# Patient Record
Sex: MALE | Race: WHITE
[De-identification: names, ages, dates, MRNs, and addresses within clinical notes are randomized per-mention and may not be internally consistent; named-entity substitution may affect disease eponyms.]

---

## 2017-05-04 ENCOUNTER — HOSPITAL ENCOUNTER (OUTPATIENT)
Dept: HOSPITAL 19 - COL.CAR | Age: 82
Discharge: HOME | End: 2017-05-04
Attending: INTERNAL MEDICINE
Payer: MEDICARE

## 2017-05-04 VITALS — HEIGHT: 68.05 IN | BODY MASS INDEX: 23.52 KG/M2 | WEIGHT: 155.21 LBS

## 2017-05-04 VITALS — DIASTOLIC BLOOD PRESSURE: 103 MMHG | HEART RATE: 52 BPM | SYSTOLIC BLOOD PRESSURE: 158 MMHG

## 2017-05-04 VITALS — SYSTOLIC BLOOD PRESSURE: 169 MMHG | HEART RATE: 44 BPM | DIASTOLIC BLOOD PRESSURE: 74 MMHG

## 2017-05-04 VITALS — HEART RATE: 44 BPM | DIASTOLIC BLOOD PRESSURE: 68 MMHG | SYSTOLIC BLOOD PRESSURE: 166 MMHG

## 2017-05-04 VITALS — HEART RATE: 42 BPM | DIASTOLIC BLOOD PRESSURE: 82 MMHG | SYSTOLIC BLOOD PRESSURE: 167 MMHG

## 2017-05-04 VITALS — HEART RATE: 44 BPM | SYSTOLIC BLOOD PRESSURE: 148 MMHG | DIASTOLIC BLOOD PRESSURE: 71 MMHG

## 2017-05-04 VITALS — DIASTOLIC BLOOD PRESSURE: 79 MMHG | HEART RATE: 51 BPM | SYSTOLIC BLOOD PRESSURE: 165 MMHG

## 2017-05-04 VITALS — SYSTOLIC BLOOD PRESSURE: 169 MMHG | DIASTOLIC BLOOD PRESSURE: 78 MMHG | HEART RATE: 48 BPM

## 2017-05-04 VITALS — SYSTOLIC BLOOD PRESSURE: 163 MMHG | DIASTOLIC BLOOD PRESSURE: 70 MMHG | HEART RATE: 43 BPM

## 2017-05-04 VITALS — HEART RATE: 68 BPM | SYSTOLIC BLOOD PRESSURE: 135 MMHG | DIASTOLIC BLOOD PRESSURE: 81 MMHG

## 2017-05-04 VITALS — DIASTOLIC BLOOD PRESSURE: 72 MMHG | HEART RATE: 49 BPM | SYSTOLIC BLOOD PRESSURE: 166 MMHG

## 2017-05-04 VITALS — DIASTOLIC BLOOD PRESSURE: 79 MMHG | HEART RATE: 48 BPM | SYSTOLIC BLOOD PRESSURE: 165 MMHG

## 2017-05-04 VITALS — SYSTOLIC BLOOD PRESSURE: 158 MMHG | DIASTOLIC BLOOD PRESSURE: 81 MMHG | HEART RATE: 54 BPM

## 2017-05-04 VITALS — TEMPERATURE: 97.6 F | DIASTOLIC BLOOD PRESSURE: 105 MMHG | HEART RATE: 54 BPM | SYSTOLIC BLOOD PRESSURE: 145 MMHG

## 2017-05-04 DIAGNOSIS — I10: ICD-10-CM

## 2017-05-04 DIAGNOSIS — I35.0: ICD-10-CM

## 2017-05-04 DIAGNOSIS — Z87.891: ICD-10-CM

## 2017-05-04 DIAGNOSIS — Z79.899: ICD-10-CM

## 2017-05-04 DIAGNOSIS — Z95.1: ICD-10-CM

## 2017-05-04 DIAGNOSIS — R06.09: ICD-10-CM

## 2017-05-04 DIAGNOSIS — Z86.73: ICD-10-CM

## 2017-05-04 DIAGNOSIS — I25.10: Primary | ICD-10-CM

## 2017-05-04 DIAGNOSIS — E78.2: ICD-10-CM

## 2017-05-04 DIAGNOSIS — J44.9: ICD-10-CM

## 2017-05-04 DIAGNOSIS — Z95.2: ICD-10-CM

## 2017-05-04 DIAGNOSIS — Z79.02: ICD-10-CM

## 2017-05-04 DIAGNOSIS — R94.39: ICD-10-CM

## 2017-05-04 DIAGNOSIS — I65.23: ICD-10-CM

## 2017-05-04 LAB
ANION GAP SERPL CALC-SCNC: 11 MMOL/L (ref 7–16)
BUN SERPL-MCNC: 20 MG/DL (ref 9–20)
CALCIUM SERPL-MCNC: 8.7 MG/DL (ref 8.4–10.2)
CHLORIDE SERPL-SCNC: 103 MMOL/L (ref 98–107)
CO2 SERPL-SCNC: 26 MMOL/L (ref 22–30)
CREAT SERPL-SCNC: 1.05 MG/DL (ref 0.66–1.25)
ERYTHROCYTE [DISTWIDTH] IN BLOOD BY AUTOMATED COUNT: 13.4 % (ref 11.5–14.5)
GLUCOSE SERPL-MCNC: 98 MG/DL (ref 74–106)
HCT VFR BLD AUTO: 41.7 % (ref 42–52)
HGB BLD-MCNC: 13.8 G/DL (ref 13.5–18)
INR BLD: 1 (ref 0.8–3)
MCH RBC QN AUTO: 31 PG (ref 27–31)
MCHC RBC AUTO-ENTMCNC: 33 G/DL (ref 33–37)
MCV RBC AUTO: 93 FL (ref 80–100)
PLATELET # BLD AUTO: 262 K/MM3 (ref 130–400)
PMV BLD AUTO: 9.7 FL (ref 7.4–10.4)
POTASSIUM SERPL-SCNC: 3.7 MMOL/L (ref 3.4–5)
PROTHROMBIN TIME: 11.3 SECONDS (ref 9.7–12.8)
RBC # BLD AUTO: 4.48 M/MM3 (ref 4.2–5.6)
SODIUM SERPL-SCNC: 140 MMOL/L (ref 137–145)
WBC # BLD AUTO: 9.5 K/MM3 (ref 4.8–10.8)

## 2017-05-04 PROCEDURE — C1769 GUIDE WIRE: HCPCS

## 2017-05-04 PROCEDURE — C1760 CLOSURE DEV, VASC: HCPCS

## 2017-05-29 ENCOUNTER — HOSPITAL ENCOUNTER (EMERGENCY)
Dept: HOSPITAL 19 - COL.ER | Age: 82
LOS: 1 days | Discharge: ANOTHER HOSPITAL | End: 2017-05-30
Payer: MEDICARE

## 2017-05-29 VITALS — BODY MASS INDEX: 24.29 KG/M2 | HEIGHT: 67.99 IN | WEIGHT: 160.28 LBS

## 2017-05-29 VITALS — TEMPERATURE: 98 F

## 2017-05-29 DIAGNOSIS — I25.10: ICD-10-CM

## 2017-05-29 DIAGNOSIS — R07.89: Primary | ICD-10-CM

## 2017-05-29 DIAGNOSIS — F31.9: ICD-10-CM

## 2017-05-29 DIAGNOSIS — Z87.891: ICD-10-CM

## 2017-05-29 DIAGNOSIS — Z79.82: ICD-10-CM

## 2017-05-29 DIAGNOSIS — Z95.9: ICD-10-CM

## 2017-05-29 DIAGNOSIS — M19.90: ICD-10-CM

## 2017-05-29 DIAGNOSIS — J45.909: ICD-10-CM

## 2017-05-29 DIAGNOSIS — K21.9: ICD-10-CM

## 2017-05-29 DIAGNOSIS — Z98.890: ICD-10-CM

## 2017-05-29 DIAGNOSIS — I10: ICD-10-CM

## 2017-05-30 VITALS — SYSTOLIC BLOOD PRESSURE: 146 MMHG | DIASTOLIC BLOOD PRESSURE: 87 MMHG | HEART RATE: 71 BPM

## 2017-05-30 LAB
ADJUSTED CALCIUM: 9 MG/DL (ref 8.4–10.2)
ALBUMIN SERPL-MCNC: 3.4 GM/DL (ref 3.5–5)
ALP SERPL-CCNC: 67 U/L (ref 50–136)
ALT SERPL-CCNC: 19 U/L (ref 21–72)
ANION GAP SERPL CALC-SCNC: 10 MMOL/L (ref 7–16)
BASOPHILS # BLD: 0.1 10*3/UL (ref 0–0.2)
BASOPHILS NFR BLD AUTO: 1.2 % (ref 0–2)
BILIRUB SERPL-MCNC: 0.5 MG/DL (ref 0–1)
BNP SERPL-MCNC: 3440 PG/ML (ref 0–450)
BUN SERPL-MCNC: 25 MG/DL (ref 9–20)
CALCIUM SERPL-MCNC: 8.5 MG/DL (ref 8.4–10.2)
CHLORIDE SERPL-SCNC: 104 MMOL/L (ref 98–107)
CO2 SERPL-SCNC: 25 MMOL/L (ref 22–30)
CREAT SERPL-SCNC: 0.98 MG/DL (ref 0.66–1.25)
EOSINOPHIL # BLD: 0.4 10*3/UL (ref 0–0.7)
EOSINOPHIL NFR BLD: 5 % (ref 0–4)
ERYTHROCYTE [DISTWIDTH] IN BLOOD BY AUTOMATED COUNT: 13.5 % (ref 11.5–14.5)
GLUCOSE SERPL-MCNC: 102 MG/DL (ref 74–106)
GRANULOCYTES # BLD AUTO: 52.6 % (ref 42.2–75.2)
HCT VFR BLD AUTO: 41.3 % (ref 42–52)
HGB BLD-MCNC: 13.6 G/DL (ref 13.5–18)
LIPASE SERPL-CCNC: 121 U/L (ref 23–300)
LYMPHOCYTES # BLD: 2.6 10*3/UL (ref 1.2–3.4)
LYMPHOCYTES NFR BLD: 30.6 % (ref 20–51)
MCH RBC QN AUTO: 31 PG (ref 27–31)
MCHC RBC AUTO-ENTMCNC: 33 G/DL (ref 33–37)
MCV RBC AUTO: 93 FL (ref 80–100)
MONOCYTES # BLD: 0.9 10*3/UL (ref 0.1–0.6)
MONOCYTES NFR BLD AUTO: 10.1 % (ref 1.7–9.3)
NEUTROPHILS # BLD: 4.4 10*3/UL (ref 1.4–6.5)
PLATELET # BLD AUTO: 232 K/MM3 (ref 130–400)
PMV BLD AUTO: 9.9 FL (ref 7.4–10.4)
POTASSIUM SERPL-SCNC: 3.6 MMOL/L (ref 3.4–5)
PROT SERPL-MCNC: 6 GM/DL (ref 6.4–8.2)
RBC # BLD AUTO: 4.44 M/MM3 (ref 4.2–5.6)
SODIUM SERPL-SCNC: 140 MMOL/L (ref 137–145)
TROPONIN I SERPL-MCNC: 0.02 NG/ML (ref 0–0.03)
WBC # BLD AUTO: 8.4 K/MM3 (ref 4.8–10.8)

## 2017-07-18 ENCOUNTER — HOSPITAL ENCOUNTER (INPATIENT)
Dept: HOSPITAL 19 - COL.ER | Age: 82
LOS: 3 days | Discharge: HOME | DRG: 281 | End: 2017-07-21
Attending: INTERNAL MEDICINE | Admitting: INTERNAL MEDICINE
Payer: MEDICARE

## 2017-07-18 VITALS — WEIGHT: 178.79 LBS | HEIGHT: 67 IN | BODY MASS INDEX: 28.06 KG/M2

## 2017-07-18 DIAGNOSIS — Z95.5: ICD-10-CM

## 2017-07-18 DIAGNOSIS — I21.4: ICD-10-CM

## 2017-07-18 DIAGNOSIS — J45.909: ICD-10-CM

## 2017-07-18 DIAGNOSIS — Z87.891: ICD-10-CM

## 2017-07-18 DIAGNOSIS — I42.9: ICD-10-CM

## 2017-07-18 DIAGNOSIS — Z86.73: ICD-10-CM

## 2017-07-18 DIAGNOSIS — I25.10: ICD-10-CM

## 2017-07-18 DIAGNOSIS — I10: Primary | ICD-10-CM

## 2017-07-18 DIAGNOSIS — Z95.2: ICD-10-CM

## 2017-07-18 LAB
ADJUSTED CALCIUM: 9.1 MG/DL (ref 8.4–10.2)
ALBUMIN SERPL-MCNC: 3.1 GM/DL (ref 3.5–5)
ALP SERPL-CCNC: 68 U/L (ref 50–136)
ALT SERPL-CCNC: 20 U/L (ref 21–72)
ANION GAP SERPL CALC-SCNC: 9 MMOL/L (ref 7–16)
APTT PPP: 30.4 SECONDS (ref 26–37)
BASOPHILS # BLD: 0.1 10*3/UL (ref 0–0.2)
BASOPHILS NFR BLD AUTO: 1.4 % (ref 0–2)
BILIRUB SERPL-MCNC: 0.6 MG/DL (ref 0–1)
BNP SERPL-MCNC: 5200 PG/ML (ref 0–450)
BUN SERPL-MCNC: 22 MG/DL (ref 9–20)
CALCIUM SERPL-MCNC: 8.4 MG/DL (ref 8.4–10.2)
CHLORIDE SERPL-SCNC: 102 MMOL/L (ref 98–107)
CO2 SERPL-SCNC: 24 MMOL/L (ref 22–30)
CREAT SERPL-SCNC: 1.01 MG/DL (ref 0.66–1.25)
EOSINOPHIL # BLD: 0.1 10*3/UL (ref 0–0.7)
EOSINOPHIL NFR BLD: 1.7 % (ref 0–4)
ERYTHROCYTE [DISTWIDTH] IN BLOOD BY AUTOMATED COUNT: 13.4 % (ref 11.5–14.5)
GLUCOSE SERPL-MCNC: 94 MG/DL (ref 74–106)
GRANULOCYTES # BLD AUTO: 39.4 % (ref 42.2–75.2)
HCT VFR BLD AUTO: 34.9 % (ref 42–52)
HGB BLD-MCNC: 11.5 G/DL (ref 13.5–18)
INR BLD: 0.9 (ref 0.8–3)
LYMPHOCYTES # BLD: 2.4 10*3/UL (ref 1.2–3.4)
LYMPHOCYTES NFR BLD: 40.7 % (ref 20–51)
MCH RBC QN AUTO: 30 PG (ref 27–31)
MCHC RBC AUTO-ENTMCNC: 33 G/DL (ref 33–37)
MCV RBC AUTO: 92 FL (ref 80–100)
MONOCYTES # BLD: 1 10*3/UL (ref 0.1–0.6)
MONOCYTES NFR BLD AUTO: 16.3 % (ref 1.7–9.3)
NEUTROPHILS # BLD: 2.3 10*3/UL (ref 1.4–6.5)
PLATELET # BLD AUTO: 188 K/MM3 (ref 130–400)
PMV BLD AUTO: 9.9 FL (ref 7.4–10.4)
POTASSIUM SERPL-SCNC: 3.6 MMOL/L (ref 3.4–5)
PROT SERPL-MCNC: 5.7 GM/DL (ref 6.4–8.2)
PROTHROMBIN TIME: 10.3 SECONDS (ref 9.7–12.8)
RBC # BLD AUTO: 3.78 M/MM3 (ref 4.2–5.6)
SODIUM SERPL-SCNC: 135 MMOL/L (ref 137–145)
TROPONIN I SERPL-MCNC: 0.24 NG/ML (ref 0–0.03)
WBC # BLD AUTO: 5.9 K/MM3 (ref 4.8–10.8)

## 2017-07-18 PROCEDURE — C1760 CLOSURE DEV, VASC: HCPCS

## 2017-07-18 PROCEDURE — C1894 INTRO/SHEATH, NON-LASER: HCPCS

## 2017-07-18 PROCEDURE — A9502 TC99M TETROFOSMIN: HCPCS

## 2017-07-19 VITALS — OXYGEN SATURATION: 99 %

## 2017-07-19 VITALS — OXYGEN SATURATION: 98 %

## 2017-07-19 VITALS — OXYGEN SATURATION: 96 %

## 2017-07-19 VITALS — OXYGEN SATURATION: 97 %

## 2017-07-19 VITALS — OXYGEN SATURATION: 93 %

## 2017-07-19 VITALS — SYSTOLIC BLOOD PRESSURE: 142 MMHG | DIASTOLIC BLOOD PRESSURE: 53 MMHG | TEMPERATURE: 97 F | HEART RATE: 46 BPM

## 2017-07-19 VITALS
OXYGEN SATURATION: 97 % | HEART RATE: 64 BPM | SYSTOLIC BLOOD PRESSURE: 167 MMHG | TEMPERATURE: 98.6 F | DIASTOLIC BLOOD PRESSURE: 57 MMHG

## 2017-07-19 VITALS — OXYGEN SATURATION: 95 %

## 2017-07-19 VITALS — OXYGEN SATURATION: 94 %

## 2017-07-19 VITALS — OXYGEN SATURATION: 92 %

## 2017-07-19 VITALS — HEART RATE: 59 BPM | TEMPERATURE: 98.2 F | SYSTOLIC BLOOD PRESSURE: 138 MMHG | DIASTOLIC BLOOD PRESSURE: 52 MMHG

## 2017-07-19 VITALS — SYSTOLIC BLOOD PRESSURE: 149 MMHG | HEART RATE: 49 BPM | DIASTOLIC BLOOD PRESSURE: 51 MMHG | TEMPERATURE: 97.6 F

## 2017-07-19 VITALS
TEMPERATURE: 98.5 F | OXYGEN SATURATION: 98 % | SYSTOLIC BLOOD PRESSURE: 144 MMHG | HEART RATE: 60 BPM | DIASTOLIC BLOOD PRESSURE: 55 MMHG

## 2017-07-19 VITALS
SYSTOLIC BLOOD PRESSURE: 139 MMHG | OXYGEN SATURATION: 98 % | DIASTOLIC BLOOD PRESSURE: 49 MMHG | HEART RATE: 49 BPM | TEMPERATURE: 98.3 F

## 2017-07-19 VITALS — HEART RATE: 47 BPM | SYSTOLIC BLOOD PRESSURE: 149 MMHG | DIASTOLIC BLOOD PRESSURE: 50 MMHG | TEMPERATURE: 98.3 F

## 2017-07-19 VITALS — SYSTOLIC BLOOD PRESSURE: 142 MMHG | DIASTOLIC BLOOD PRESSURE: 53 MMHG | HEART RATE: 46 BPM

## 2017-07-19 VITALS — DIASTOLIC BLOOD PRESSURE: 62 MMHG | HEART RATE: 82 BPM | SYSTOLIC BLOOD PRESSURE: 144 MMHG

## 2017-07-19 VITALS — OXYGEN SATURATION: 91 %

## 2017-07-19 VITALS — HEART RATE: 54 BPM | TEMPERATURE: 97.1 F | SYSTOLIC BLOOD PRESSURE: 140 MMHG | DIASTOLIC BLOOD PRESSURE: 53 MMHG

## 2017-07-19 VITALS — SYSTOLIC BLOOD PRESSURE: 158 MMHG | HEART RATE: 75 BPM | DIASTOLIC BLOOD PRESSURE: 57 MMHG

## 2017-07-19 VITALS — SYSTOLIC BLOOD PRESSURE: 155 MMHG | HEART RATE: 66 BPM | DIASTOLIC BLOOD PRESSURE: 59 MMHG

## 2017-07-19 VITALS — OXYGEN SATURATION: 100 %

## 2017-07-19 LAB
ANION GAP SERPL CALC-SCNC: 5 MMOL/L (ref 7–16)
BASOPHILS # BLD: 0.1 10*3/UL (ref 0–0.2)
BASOPHILS NFR BLD AUTO: 1.3 % (ref 0–2)
BUN SERPL-MCNC: 21 MG/DL (ref 9–20)
CALCIUM SERPL-MCNC: 8 MG/DL (ref 8.4–10.2)
CHLORIDE SERPL-SCNC: 101 MMOL/L (ref 98–107)
CHOLEST SPEC-SCNC: 149 MG/DL (ref 120–200)
CO2 SERPL-SCNC: 26 MMOL/L (ref 22–30)
CREAT SERPL-SCNC: 1.06 MG/DL (ref 0.66–1.25)
EOSINOPHIL # BLD: 0.2 10*3/UL (ref 0–0.7)
EOSINOPHIL NFR BLD: 3.8 % (ref 0–4)
ERYTHROCYTE [DISTWIDTH] IN BLOOD BY AUTOMATED COUNT: 13.5 % (ref 11.5–14.5)
GLUCOSE SERPL-MCNC: 86 MG/DL (ref 74–106)
GRANULOCYTES # BLD AUTO: 37.7 % (ref 42.2–75.2)
HCT VFR BLD AUTO: 30.5 % (ref 42–52)
HDLC SERPL-MCNC: 40 MG/DL
HGB BLD-MCNC: 10 G/DL (ref 13.5–18)
LDLC SERPL-MCNC: 84 MG/DL
LYMPHOCYTES # BLD: 2.3 10*3/UL (ref 1.2–3.4)
LYMPHOCYTES NFR BLD: 41.6 % (ref 20–51)
MAGNESIUM SERPL-MCNC: 2 MG/DL (ref 1.6–2.3)
MCH RBC QN AUTO: 31 PG (ref 27–31)
MCHC RBC AUTO-ENTMCNC: 33 G/DL (ref 33–37)
MCV RBC AUTO: 93 FL (ref 80–100)
MONOCYTES # BLD: 0.8 10*3/UL (ref 0.1–0.6)
MONOCYTES NFR BLD AUTO: 15.1 % (ref 1.7–9.3)
NEUTROPHILS # BLD: 2.1 10*3/UL (ref 1.4–6.5)
PLATELET # BLD AUTO: 170 K/MM3 (ref 130–400)
PMV BLD AUTO: 10.1 FL (ref 7.4–10.4)
POTASSIUM SERPL-SCNC: 3.7 MMOL/L (ref 3.4–5)
RBC # BLD AUTO: 3.27 M/MM3 (ref 4.2–5.6)
SODIUM SERPL-SCNC: 133 MMOL/L (ref 137–145)
TRIGL SERPL-MCNC: 126 MG/DL
WBC # BLD AUTO: 5.5 K/MM3 (ref 4.8–10.8)

## 2017-07-20 VITALS — SYSTOLIC BLOOD PRESSURE: 148 MMHG | DIASTOLIC BLOOD PRESSURE: 51 MMHG | HEART RATE: 52 BPM

## 2017-07-20 VITALS — OXYGEN SATURATION: 93 %

## 2017-07-20 VITALS — OXYGEN SATURATION: 94 %

## 2017-07-20 VITALS — OXYGEN SATURATION: 92 %

## 2017-07-20 VITALS — OXYGEN SATURATION: 96 %

## 2017-07-20 VITALS — TEMPERATURE: 98.5 F | SYSTOLIC BLOOD PRESSURE: 157 MMHG | DIASTOLIC BLOOD PRESSURE: 49 MMHG | HEART RATE: 57 BPM

## 2017-07-20 VITALS — TEMPERATURE: 97.5 F | DIASTOLIC BLOOD PRESSURE: 44 MMHG | SYSTOLIC BLOOD PRESSURE: 113 MMHG | HEART RATE: 61 BPM

## 2017-07-20 VITALS — OXYGEN SATURATION: 95 %

## 2017-07-20 VITALS — OXYGEN SATURATION: 98 %

## 2017-07-20 VITALS — OXYGEN SATURATION: 97 %

## 2017-07-20 VITALS — HEART RATE: 52 BPM | DIASTOLIC BLOOD PRESSURE: 58 MMHG | SYSTOLIC BLOOD PRESSURE: 159 MMHG

## 2017-07-20 VITALS — HEART RATE: 48 BPM | DIASTOLIC BLOOD PRESSURE: 46 MMHG | OXYGEN SATURATION: 92 % | SYSTOLIC BLOOD PRESSURE: 128 MMHG

## 2017-07-20 VITALS — DIASTOLIC BLOOD PRESSURE: 62 MMHG | SYSTOLIC BLOOD PRESSURE: 156 MMHG | HEART RATE: 69 BPM

## 2017-07-20 VITALS — DIASTOLIC BLOOD PRESSURE: 55 MMHG | TEMPERATURE: 97.5 F | HEART RATE: 51 BPM | SYSTOLIC BLOOD PRESSURE: 154 MMHG

## 2017-07-20 VITALS — HEART RATE: 55 BPM | SYSTOLIC BLOOD PRESSURE: 150 MMHG | DIASTOLIC BLOOD PRESSURE: 56 MMHG

## 2017-07-20 VITALS — SYSTOLIC BLOOD PRESSURE: 147 MMHG | HEART RATE: 50 BPM | DIASTOLIC BLOOD PRESSURE: 52 MMHG

## 2017-07-20 VITALS — OXYGEN SATURATION: 91 %

## 2017-07-20 VITALS — OXYGEN SATURATION: 96 % | SYSTOLIC BLOOD PRESSURE: 158 MMHG | DIASTOLIC BLOOD PRESSURE: 55 MMHG | HEART RATE: 52 BPM

## 2017-07-20 VITALS — HEART RATE: 57 BPM | TEMPERATURE: 97.8 F | DIASTOLIC BLOOD PRESSURE: 61 MMHG | SYSTOLIC BLOOD PRESSURE: 167 MMHG

## 2017-07-20 VITALS — SYSTOLIC BLOOD PRESSURE: 151 MMHG | HEART RATE: 55 BPM | DIASTOLIC BLOOD PRESSURE: 55 MMHG

## 2017-07-20 VITALS — DIASTOLIC BLOOD PRESSURE: 49 MMHG | HEART RATE: 91 BPM | SYSTOLIC BLOOD PRESSURE: 149 MMHG

## 2017-07-20 VITALS
OXYGEN SATURATION: 97 % | HEART RATE: 53 BPM | SYSTOLIC BLOOD PRESSURE: 172 MMHG | TEMPERATURE: 97.2 F | DIASTOLIC BLOOD PRESSURE: 60 MMHG

## 2017-07-20 VITALS — SYSTOLIC BLOOD PRESSURE: 113 MMHG | HEART RATE: 61 BPM | DIASTOLIC BLOOD PRESSURE: 44 MMHG | TEMPERATURE: 97.5 F

## 2017-07-20 VITALS — SYSTOLIC BLOOD PRESSURE: 133 MMHG | DIASTOLIC BLOOD PRESSURE: 50 MMHG | HEART RATE: 69 BPM

## 2017-07-20 VITALS — DIASTOLIC BLOOD PRESSURE: 59 MMHG | SYSTOLIC BLOOD PRESSURE: 155 MMHG | HEART RATE: 55 BPM

## 2017-07-20 VITALS — SYSTOLIC BLOOD PRESSURE: 156 MMHG | DIASTOLIC BLOOD PRESSURE: 59 MMHG | HEART RATE: 49 BPM

## 2017-07-20 VITALS — OXYGEN SATURATION: 94 % | DIASTOLIC BLOOD PRESSURE: 44 MMHG | HEART RATE: 60 BPM | SYSTOLIC BLOOD PRESSURE: 121 MMHG

## 2017-07-20 VITALS — OXYGEN SATURATION: 83 %

## 2017-07-20 LAB
ANION GAP SERPL CALC-SCNC: 5 MMOL/L (ref 7–16)
BASOPHILS # BLD: 0.1 10*3/UL (ref 0–0.2)
BASOPHILS NFR BLD AUTO: 1.2 % (ref 0–2)
BUN SERPL-MCNC: 19 MG/DL (ref 9–20)
CALCIUM SERPL-MCNC: 7.7 MG/DL (ref 8.4–10.2)
CHLORIDE SERPL-SCNC: 106 MMOL/L (ref 98–107)
CO2 SERPL-SCNC: 25 MMOL/L (ref 22–30)
CREAT SERPL-SCNC: 1.09 MG/DL (ref 0.66–1.25)
EOSINOPHIL # BLD: 0.3 10*3/UL (ref 0–0.7)
EOSINOPHIL NFR BLD: 3.8 % (ref 0–4)
ERYTHROCYTE [DISTWIDTH] IN BLOOD BY AUTOMATED COUNT: 13.7 % (ref 11.5–14.5)
GLUCOSE SERPL-MCNC: 80 MG/DL (ref 74–106)
GRANULOCYTES # BLD AUTO: 54 % (ref 42.2–75.2)
HCT VFR BLD AUTO: 34.5 % (ref 42–52)
HGB BLD-MCNC: 11.4 G/DL (ref 13.5–18)
INR BLD: 1 (ref 0.8–3)
LYMPHOCYTES # BLD: 1.9 10*3/UL (ref 1.2–3.4)
LYMPHOCYTES NFR BLD: 28.9 % (ref 20–51)
MCH RBC QN AUTO: 31 PG (ref 27–31)
MCHC RBC AUTO-ENTMCNC: 33 G/DL (ref 33–37)
MCV RBC AUTO: 93 FL (ref 80–100)
MONOCYTES # BLD: 0.8 10*3/UL (ref 0.1–0.6)
MONOCYTES NFR BLD AUTO: 11.5 % (ref 1.7–9.3)
NEUTROPHILS # BLD: 3.5 10*3/UL (ref 1.4–6.5)
PLATELET # BLD AUTO: 178 K/MM3 (ref 130–400)
PMV BLD AUTO: 9.4 FL (ref 7.4–10.4)
POTASSIUM SERPL-SCNC: 3.8 MMOL/L (ref 3.4–5)
PROTHROMBIN TIME: 11.2 SECONDS (ref 9.7–12.8)
RBC # BLD AUTO: 3.72 M/MM3 (ref 4.2–5.6)
SODIUM SERPL-SCNC: 135 MMOL/L (ref 137–145)
WBC # BLD AUTO: 6.5 K/MM3 (ref 4.8–10.8)

## 2017-07-20 PROCEDURE — B2111ZZ FLUOROSCOPY OF MULTIPLE CORONARY ARTERIES USING LOW OSMOLAR CONTRAST: ICD-10-PCS | Performed by: INTERNAL MEDICINE

## 2017-07-21 VITALS — SYSTOLIC BLOOD PRESSURE: 143 MMHG | DIASTOLIC BLOOD PRESSURE: 47 MMHG | HEART RATE: 55 BPM | TEMPERATURE: 97.6 F

## 2017-07-21 VITALS — HEART RATE: 72 BPM | SYSTOLIC BLOOD PRESSURE: 120 MMHG | DIASTOLIC BLOOD PRESSURE: 48 MMHG | TEMPERATURE: 97.4 F

## 2017-07-21 VITALS — DIASTOLIC BLOOD PRESSURE: 54 MMHG | SYSTOLIC BLOOD PRESSURE: 164 MMHG | HEART RATE: 66 BPM | TEMPERATURE: 99.2 F

## 2017-07-21 VITALS — DIASTOLIC BLOOD PRESSURE: 49 MMHG | TEMPERATURE: 98.5 F | SYSTOLIC BLOOD PRESSURE: 157 MMHG | HEART RATE: 57 BPM

## 2017-07-21 VITALS — TEMPERATURE: 99.2 F | SYSTOLIC BLOOD PRESSURE: 164 MMHG | DIASTOLIC BLOOD PRESSURE: 54 MMHG | HEART RATE: 66 BPM

## 2018-03-16 ENCOUNTER — HOSPITAL ENCOUNTER (OUTPATIENT)
Dept: HOSPITAL 19 - SDCO | Age: 83
Discharge: HOME | End: 2018-03-16
Attending: SPECIALIST
Payer: MEDICARE

## 2018-03-16 VITALS — HEART RATE: 61 BPM | DIASTOLIC BLOOD PRESSURE: 46 MMHG | SYSTOLIC BLOOD PRESSURE: 126 MMHG

## 2018-03-16 VITALS — WEIGHT: 149.47 LBS | BODY MASS INDEX: 22.65 KG/M2 | HEIGHT: 68 IN

## 2018-03-16 VITALS — HEART RATE: 59 BPM | SYSTOLIC BLOOD PRESSURE: 129 MMHG | DIASTOLIC BLOOD PRESSURE: 48 MMHG

## 2018-03-16 VITALS — TEMPERATURE: 97.8 F | HEART RATE: 65 BPM | DIASTOLIC BLOOD PRESSURE: 56 MMHG | SYSTOLIC BLOOD PRESSURE: 125 MMHG

## 2018-03-16 VITALS — SYSTOLIC BLOOD PRESSURE: 109 MMHG | HEART RATE: 57 BPM | DIASTOLIC BLOOD PRESSURE: 46 MMHG | TEMPERATURE: 97.1 F

## 2018-03-16 VITALS — HEART RATE: 48 BPM | DIASTOLIC BLOOD PRESSURE: 50 MMHG | SYSTOLIC BLOOD PRESSURE: 109 MMHG

## 2018-03-16 DIAGNOSIS — R63.4: ICD-10-CM

## 2018-03-16 DIAGNOSIS — I10: ICD-10-CM

## 2018-03-16 DIAGNOSIS — M19.90: ICD-10-CM

## 2018-03-16 DIAGNOSIS — K21.9: ICD-10-CM

## 2018-03-16 DIAGNOSIS — I25.10: ICD-10-CM

## 2018-03-16 DIAGNOSIS — D64.9: ICD-10-CM

## 2018-03-16 DIAGNOSIS — Z79.01: ICD-10-CM

## 2018-03-16 DIAGNOSIS — E03.9: ICD-10-CM

## 2018-03-16 DIAGNOSIS — R19.7: ICD-10-CM

## 2018-03-16 DIAGNOSIS — I42.9: ICD-10-CM

## 2018-03-16 DIAGNOSIS — K59.00: Primary | ICD-10-CM

## 2018-03-16 DIAGNOSIS — J45.909: ICD-10-CM

## 2018-03-16 DIAGNOSIS — R10.13: ICD-10-CM

## 2018-03-16 DIAGNOSIS — J84.10: ICD-10-CM

## 2018-03-16 DIAGNOSIS — K57.30: ICD-10-CM

## 2018-03-16 DIAGNOSIS — Z95.2: ICD-10-CM

## 2018-03-16 DIAGNOSIS — E78.00: ICD-10-CM

## 2018-03-16 DIAGNOSIS — K44.9: ICD-10-CM

## 2018-03-16 DIAGNOSIS — Z88.1: ICD-10-CM

## 2018-05-10 ENCOUNTER — HOSPITAL ENCOUNTER (INPATIENT)
Dept: HOSPITAL 19 - COL.ER | Age: 83
LOS: 2 days | Discharge: HOME | DRG: 868 | End: 2018-05-12
Attending: FAMILY MEDICINE | Admitting: FAMILY MEDICINE
Payer: MEDICARE

## 2018-05-10 VITALS — HEART RATE: 88 BPM | SYSTOLIC BLOOD PRESSURE: 138 MMHG | DIASTOLIC BLOOD PRESSURE: 47 MMHG

## 2018-05-10 VITALS — WEIGHT: 135.36 LBS | BODY MASS INDEX: 20.52 KG/M2 | HEIGHT: 67.99 IN

## 2018-05-10 VITALS — SYSTOLIC BLOOD PRESSURE: 118 MMHG | DIASTOLIC BLOOD PRESSURE: 44 MMHG | TEMPERATURE: 97.9 F | HEART RATE: 79 BPM

## 2018-05-10 DIAGNOSIS — E87.6: ICD-10-CM

## 2018-05-10 DIAGNOSIS — Z95.2: ICD-10-CM

## 2018-05-10 DIAGNOSIS — Z87.891: ICD-10-CM

## 2018-05-10 DIAGNOSIS — A02.8: Primary | ICD-10-CM

## 2018-05-10 DIAGNOSIS — Z79.01: ICD-10-CM

## 2018-05-10 DIAGNOSIS — I10: ICD-10-CM

## 2018-05-10 DIAGNOSIS — Z95.5: ICD-10-CM

## 2018-05-10 DIAGNOSIS — I25.10: ICD-10-CM

## 2018-05-10 DIAGNOSIS — Z86.73: ICD-10-CM

## 2018-05-10 DIAGNOSIS — E86.0: ICD-10-CM

## 2018-05-10 DIAGNOSIS — I42.9: ICD-10-CM

## 2018-05-10 DIAGNOSIS — N17.9: ICD-10-CM

## 2018-05-10 LAB
ALBUMIN SERPL-MCNC: 3.6 GM/DL (ref 3.5–5)
ALP SERPL-CCNC: 68 U/L (ref 50–136)
ALT SERPL-CCNC: 23 U/L (ref 21–72)
ANION GAP SERPL CALC-SCNC: 17 MMOL/L (ref 7–16)
ANION GAP SERPL CALC-SCNC: 17 MMOL/L (ref 7–16)
APTT PPP: 26.2 SECONDS (ref 26–37)
AST SERPL-CCNC: 23 U/L (ref 15–37)
BASOPHILS # BLD: 0.1 10*3/UL (ref 0–0.2)
BASOPHILS # BLD: 0.1 10*3/UL (ref 0–0.2)
BASOPHILS NFR BLD AUTO: 0.6 % (ref 0–2)
BASOPHILS NFR BLD AUTO: 1 % (ref 0–2)
BILIRUB SERPL-MCNC: 0.3 MG/DL (ref 0–1)
BUN SERPL-MCNC: 59 MG/DL (ref 9–20)
BUN SERPL-MCNC: 60 MG/DL (ref 9–20)
CALCIUM SERPL-MCNC: 8.1 MG/DL (ref 8.4–10.2)
CALCIUM SERPL-MCNC: 8.5 MG/DL (ref 8.4–10.2)
CHLORIDE SERPL-SCNC: 98 MMOL/L (ref 98–107)
CHLORIDE SERPL-SCNC: 98 MMOL/L (ref 98–107)
CO2 SERPL-SCNC: 19 MMOL/L (ref 22–30)
CO2 SERPL-SCNC: 20 MMOL/L (ref 22–30)
CREAT SERPL-SCNC: 3.92 MG/DL (ref 0.66–1.25)
CREAT SERPL-SCNC: 4.37 MG/DL (ref 0.66–1.25)
EOSINOPHIL # BLD: 0.1 10*3/UL (ref 0–0.7)
EOSINOPHIL # BLD: 0.1 10*3/UL (ref 0–0.7)
EOSINOPHIL NFR BLD: 1 % (ref 0–4)
EOSINOPHIL NFR BLD: 1.1 % (ref 0–4)
ERYTHROCYTE [DISTWIDTH] IN BLOOD BY AUTOMATED COUNT: 12.8 % (ref 11.5–14.5)
ERYTHROCYTE [DISTWIDTH] IN BLOOD BY AUTOMATED COUNT: 12.8 % (ref 11.5–14.5)
GLUCOSE SERPL-MCNC: 118 MG/DL (ref 74–106)
GLUCOSE SERPL-MCNC: 131 MG/DL (ref 74–106)
GRAN CASTS #/AREA URNS LPF: (no result) /LPF
GRANULOCYTES # BLD AUTO: 57.8 % (ref 42.2–75.2)
GRANULOCYTES # BLD AUTO: 63.4 % (ref 42.2–75.2)
HCT VFR BLD AUTO: 35.2 % (ref 42–52)
HCT VFR BLD AUTO: 36.8 % (ref 42–52)
HGB BLD-MCNC: 11.9 G/DL (ref 13.5–18)
HGB BLD-MCNC: 12.7 G/DL (ref 13.5–18)
HYALINE CASTS #/AREA URNS LPF: >12 /LPF
INR BLD: 0.9 (ref 0.8–3)
LIPASE SERPL-CCNC: 73 U/L (ref 23–300)
LYMPHOCYTES # BLD: 1.5 10*3/UL (ref 1.2–3.4)
LYMPHOCYTES # BLD: 1.9 10*3/UL (ref 1.2–3.4)
LYMPHOCYTES NFR BLD: 17.9 % (ref 20–51)
LYMPHOCYTES NFR BLD: 24.1 % (ref 20–51)
MAGNESIUM SERPL-MCNC: 2.2 MG/DL (ref 1.6–2.3)
MCH RBC QN AUTO: 31 PG (ref 27–31)
MCH RBC QN AUTO: 31 PG (ref 27–31)
MCHC RBC AUTO-ENTMCNC: 34 G/DL (ref 33–37)
MCHC RBC AUTO-ENTMCNC: 35 G/DL (ref 33–37)
MCV RBC AUTO: 90 FL (ref 80–100)
MCV RBC AUTO: 91 FL (ref 80–100)
MONOCYTES # BLD: 1.3 10*3/UL (ref 0.1–0.6)
MONOCYTES # BLD: 1.4 10*3/UL (ref 0.1–0.6)
MONOCYTES NFR BLD AUTO: 16 % (ref 1.7–9.3)
MONOCYTES NFR BLD AUTO: 16.1 % (ref 1.7–9.3)
NEUTROPHILS # BLD: 4.6 10*3/UL (ref 1.4–6.5)
NEUTROPHILS # BLD: 5.3 10*3/UL (ref 1.4–6.5)
PH UR STRIP.AUTO: 5 [PH] (ref 5–8)
PHOSPHATE SERPL-MCNC: 6.5 MG/DL (ref 2.5–4.5)
PLATELET # BLD AUTO: 243 K/MM3 (ref 130–400)
PLATELET # BLD AUTO: 262 K/MM3 (ref 130–400)
PMV BLD AUTO: 10.1 FL (ref 7.4–10.4)
PMV BLD AUTO: 10.3 FL (ref 7.4–10.4)
POTASSIUM SERPL-SCNC: 3.5 MMOL/L (ref 3.4–5)
POTASSIUM SERPL-SCNC: 3.5 MMOL/L (ref 3.4–5)
PROT SERPL-MCNC: 6.9 GM/DL (ref 6.4–8.2)
PROTHROMBIN TIME: 9.9 SECONDS (ref 9.7–12.8)
RBC # BLD AUTO: 3.87 M/MM3 (ref 4.2–5.6)
RBC # BLD AUTO: 4.09 M/MM3 (ref 4.2–5.6)
RBC # UR: (no result) /HPF
SODIUM SERPL-SCNC: 134 MMOL/L (ref 137–145)
SODIUM SERPL-SCNC: 135 MMOL/L (ref 137–145)
SP GR UR STRIP.AUTO: 1.02 (ref 1–1.03)
SQUAMOUS # URNS: (no result) /HPF
URN COLLECT METHOD CLASS: (no result)

## 2018-05-11 VITALS — SYSTOLIC BLOOD PRESSURE: 134 MMHG | DIASTOLIC BLOOD PRESSURE: 77 MMHG | TEMPERATURE: 98.3 F | HEART RATE: 67 BPM

## 2018-05-11 VITALS — HEART RATE: 57 BPM | SYSTOLIC BLOOD PRESSURE: 150 MMHG | TEMPERATURE: 96.7 F | DIASTOLIC BLOOD PRESSURE: 45 MMHG

## 2018-05-11 VITALS — HEART RATE: 72 BPM | SYSTOLIC BLOOD PRESSURE: 127 MMHG | TEMPERATURE: 98.8 F | DIASTOLIC BLOOD PRESSURE: 43 MMHG

## 2018-05-11 VITALS — HEART RATE: 61 BPM | SYSTOLIC BLOOD PRESSURE: 146 MMHG | TEMPERATURE: 97.6 F | DIASTOLIC BLOOD PRESSURE: 49 MMHG

## 2018-05-11 VITALS — DIASTOLIC BLOOD PRESSURE: 49 MMHG | HEART RATE: 73 BPM | SYSTOLIC BLOOD PRESSURE: 132 MMHG

## 2018-05-11 VITALS — DIASTOLIC BLOOD PRESSURE: 42 MMHG | TEMPERATURE: 98.2 F | HEART RATE: 58 BPM | SYSTOLIC BLOOD PRESSURE: 137 MMHG

## 2018-05-11 LAB
ANION GAP SERPL CALC-SCNC: 12 MMOL/L (ref 7–16)
BASOPHILS # BLD: 0.1 10*3/UL (ref 0–0.2)
BASOPHILS NFR BLD AUTO: 1.3 % (ref 0–2)
BUN SERPL-MCNC: 54 MG/DL (ref 9–20)
CALCIUM SERPL-MCNC: 7.4 MG/DL (ref 8.4–10.2)
CHLORIDE SERPL-SCNC: 102 MMOL/L (ref 98–107)
CO2 SERPL-SCNC: 21 MMOL/L (ref 22–30)
CREAT SERPL-SCNC: 2.67 MG/DL (ref 0.66–1.25)
EOSINOPHIL # BLD: 0.1 10*3/UL (ref 0–0.7)
EOSINOPHIL NFR BLD: 2.5 % (ref 0–4)
ERYTHROCYTE [DISTWIDTH] IN BLOOD BY AUTOMATED COUNT: 12.8 % (ref 11.5–14.5)
GLUCOSE SERPL-MCNC: 89 MG/DL (ref 74–106)
GRANULOCYTES # BLD AUTO: 61.1 % (ref 42.2–75.2)
HCT VFR BLD AUTO: 31 % (ref 42–52)
HGB BLD-MCNC: 10.5 G/DL (ref 13.5–18)
LYMPHOCYTES # BLD: 1.1 10*3/UL (ref 1.2–3.4)
LYMPHOCYTES NFR BLD: 19.2 % (ref 20–51)
MAGNESIUM SERPL-MCNC: 2 MG/DL (ref 1.6–2.3)
MCH RBC QN AUTO: 31 PG (ref 27–31)
MCHC RBC AUTO-ENTMCNC: 34 G/DL (ref 33–37)
MCV RBC AUTO: 91 FL (ref 80–100)
MONOCYTES # BLD: 0.9 10*3/UL (ref 0.1–0.6)
MONOCYTES NFR BLD AUTO: 15.2 % (ref 1.7–9.3)
NEUTROPHILS # BLD: 3.4 10*3/UL (ref 1.4–6.5)
PHOSPHATE SERPL-MCNC: 4.4 MG/DL (ref 2.5–4.5)
PLATELET # BLD AUTO: 216 K/MM3 (ref 130–400)
PMV BLD AUTO: 10.3 FL (ref 7.4–10.4)
POTASSIUM SERPL-SCNC: 3 MMOL/L (ref 3.4–5)
RBC # BLD AUTO: 3.39 M/MM3 (ref 4.2–5.6)
SODIUM SERPL-SCNC: 135 MMOL/L (ref 137–145)

## 2018-05-12 VITALS — TEMPERATURE: 98.1 F | HEART RATE: 55 BPM | DIASTOLIC BLOOD PRESSURE: 37 MMHG | SYSTOLIC BLOOD PRESSURE: 145 MMHG

## 2018-05-12 VITALS — DIASTOLIC BLOOD PRESSURE: 44 MMHG | TEMPERATURE: 98.2 F | HEART RATE: 63 BPM | SYSTOLIC BLOOD PRESSURE: 145 MMHG

## 2018-05-12 VITALS — DIASTOLIC BLOOD PRESSURE: 44 MMHG | HEART RATE: 52 BPM | SYSTOLIC BLOOD PRESSURE: 151 MMHG | TEMPERATURE: 98 F

## 2018-05-12 VITALS — HEART RATE: 52 BPM | DIASTOLIC BLOOD PRESSURE: 38 MMHG | SYSTOLIC BLOOD PRESSURE: 151 MMHG | TEMPERATURE: 98.2 F

## 2018-05-12 LAB
ANION GAP SERPL CALC-SCNC: 13 MMOL/L (ref 7–16)
BASOPHILS # BLD: 0.1 10*3/UL (ref 0–0.2)
BASOPHILS NFR BLD AUTO: 0.9 % (ref 0–2)
BUN SERPL-MCNC: 40 MG/DL (ref 9–20)
CALCIUM SERPL-MCNC: 7.8 MG/DL (ref 8.4–10.2)
CHLORIDE SERPL-SCNC: 106 MMOL/L (ref 98–107)
CO2 SERPL-SCNC: 20 MMOL/L (ref 22–30)
CREAT SERPL-SCNC: 2.02 MG/DL (ref 0.66–1.25)
EOSINOPHIL # BLD: 0.2 10*3/UL (ref 0–0.7)
EOSINOPHIL NFR BLD: 2.8 % (ref 0–4)
ERYTHROCYTE [DISTWIDTH] IN BLOOD BY AUTOMATED COUNT: 12.8 % (ref 11.5–14.5)
GLUCOSE SERPL-MCNC: 84 MG/DL (ref 74–106)
GRANULOCYTES # BLD AUTO: 46.8 % (ref 42.2–75.2)
HCT VFR BLD AUTO: 31.7 % (ref 42–52)
HGB BLD-MCNC: 10.5 G/DL (ref 13.5–18)
LYMPHOCYTES # BLD: 1.8 10*3/UL (ref 1.2–3.4)
LYMPHOCYTES NFR BLD: 33.5 % (ref 20–51)
MAGNESIUM SERPL-MCNC: 1.9 MG/DL (ref 1.6–2.3)
MCH RBC QN AUTO: 31 PG (ref 27–31)
MCHC RBC AUTO-ENTMCNC: 33 G/DL (ref 33–37)
MCV RBC AUTO: 92 FL (ref 80–100)
MONOCYTES # BLD: 0.8 10*3/UL (ref 0.1–0.6)
MONOCYTES NFR BLD AUTO: 14.7 % (ref 1.7–9.3)
NEUTROPHILS # BLD: 2.6 10*3/UL (ref 1.4–6.5)
PLATELET # BLD AUTO: 233 K/MM3 (ref 130–400)
PMV BLD AUTO: 10.5 FL (ref 7.4–10.4)
POTASSIUM SERPL-SCNC: 3.4 MMOL/L (ref 3.4–5)
RBC # BLD AUTO: 3.43 M/MM3 (ref 4.2–5.6)
SODIUM SERPL-SCNC: 139 MMOL/L (ref 137–145)

## 2018-07-06 ENCOUNTER — HOSPITAL ENCOUNTER (OUTPATIENT)
Dept: HOSPITAL 19 - COL.CR | Age: 83
LOS: 53 days | Discharge: HOME | End: 2018-08-28
Payer: SELF-PAY

## 2018-07-06 DIAGNOSIS — Z02.9: Primary | ICD-10-CM

## 2020-06-15 ENCOUNTER — HOSPITAL ENCOUNTER (INPATIENT)
Dept: HOSPITAL 19 - COL.CARD | Age: 85
LOS: 2 days | Discharge: HOME | DRG: 310 | End: 2020-06-17
Attending: INTERNAL MEDICINE | Admitting: INTERNAL MEDICINE
Payer: MEDICARE

## 2020-06-15 VITALS — HEART RATE: 66 BPM | DIASTOLIC BLOOD PRESSURE: 58 MMHG | SYSTOLIC BLOOD PRESSURE: 154 MMHG

## 2020-06-15 VITALS — SYSTOLIC BLOOD PRESSURE: 161 MMHG | TEMPERATURE: 97.7 F | HEART RATE: 62 BPM | DIASTOLIC BLOOD PRESSURE: 52 MMHG

## 2020-06-15 VITALS — HEART RATE: 60 BPM | DIASTOLIC BLOOD PRESSURE: 67 MMHG | TEMPERATURE: 98.9 F | SYSTOLIC BLOOD PRESSURE: 145 MMHG

## 2020-06-15 VITALS — HEART RATE: 60 BPM | DIASTOLIC BLOOD PRESSURE: 56 MMHG | SYSTOLIC BLOOD PRESSURE: 139 MMHG

## 2020-06-15 VITALS — WEIGHT: 162.92 LBS | HEIGHT: 67.99 IN | BODY MASS INDEX: 24.69 KG/M2

## 2020-06-15 VITALS — HEART RATE: 61 BPM | SYSTOLIC BLOOD PRESSURE: 153 MMHG | DIASTOLIC BLOOD PRESSURE: 50 MMHG | TEMPERATURE: 97.4 F

## 2020-06-15 VITALS — HEART RATE: 61 BPM | DIASTOLIC BLOOD PRESSURE: 50 MMHG | TEMPERATURE: 97.4 F | SYSTOLIC BLOOD PRESSURE: 153 MMHG

## 2020-06-15 VITALS — DIASTOLIC BLOOD PRESSURE: 58 MMHG | HEART RATE: 68 BPM | SYSTOLIC BLOOD PRESSURE: 160 MMHG | TEMPERATURE: 97.8 F

## 2020-06-15 VITALS — TEMPERATURE: 97.4 F | SYSTOLIC BLOOD PRESSURE: 153 MMHG | HEART RATE: 61 BPM | DIASTOLIC BLOOD PRESSURE: 50 MMHG

## 2020-06-15 VITALS — HEART RATE: 60 BPM | SYSTOLIC BLOOD PRESSURE: 156 MMHG | DIASTOLIC BLOOD PRESSURE: 57 MMHG

## 2020-06-15 VITALS — DIASTOLIC BLOOD PRESSURE: 59 MMHG | HEART RATE: 61 BPM | SYSTOLIC BLOOD PRESSURE: 168 MMHG

## 2020-06-15 VITALS — SYSTOLIC BLOOD PRESSURE: 84 MMHG | DIASTOLIC BLOOD PRESSURE: 65 MMHG | HEART RATE: 80 BPM

## 2020-06-15 DIAGNOSIS — I48.91: Primary | ICD-10-CM

## 2020-06-15 LAB
ALBUMIN SERPL-MCNC: 3.5 GM/DL (ref 3.5–5)
ALP SERPL-CCNC: 78 U/L (ref 50–136)
ALT SERPL-CCNC: 18 U/L (ref 4–49)
ANION GAP SERPL CALC-SCNC: 5 MMOL/L (ref 7–16)
AST SERPL-CCNC: 28 U/L (ref 15–37)
BASOPHILS # BLD: 0.1 10*3/UL (ref 0–0.2)
BASOPHILS NFR BLD AUTO: 1.4 % (ref 0–2)
BILIRUB SERPL-MCNC: 0.6 MG/DL (ref 0–1)
BUN SERPL-MCNC: 36 MG/DL (ref 9–20)
CALCIUM SERPL-MCNC: 8.5 MG/DL (ref 8.4–10.2)
CHLORIDE SERPL-SCNC: 105 MMOL/L (ref 98–107)
CO2 SERPL-SCNC: 28 MMOL/L (ref 22–30)
CREAT SERPL-SCNC: 1.82 UMOL/L (ref 0.66–1.25)
EOSINOPHIL # BLD: 0.2 10*3/UL (ref 0–0.7)
EOSINOPHIL NFR BLD: 3 % (ref 0–4)
ERYTHROCYTE [DISTWIDTH] IN BLOOD BY AUTOMATED COUNT: 14.7 % (ref 11.5–14.5)
GLUCOSE SERPL-MCNC: 89 MG/DL (ref 74–106)
GRANULOCYTES # BLD AUTO: 58.8 % (ref 42.2–75.2)
HCT VFR BLD AUTO: 37 % (ref 42–52)
HGB BLD-MCNC: 11.9 G/DL (ref 13.5–18)
INR BLD: 1 (ref 0.8–3)
LYMPHOCYTES # BLD: 1.9 10*3/UL (ref 1.2–3.4)
LYMPHOCYTES NFR BLD: 25.3 % (ref 20–51)
MAGNESIUM SERPL-MCNC: 2.2 MG/DL (ref 1.6–2.3)
MCH RBC QN AUTO: 31 PG (ref 27–31)
MCHC RBC AUTO-ENTMCNC: 32 G/DL (ref 33–37)
MCV RBC AUTO: 96 FL (ref 80–100)
MONOCYTES # BLD: 0.8 10*3/UL (ref 0.1–0.6)
MONOCYTES NFR BLD AUTO: 10.8 % (ref 1.7–9.3)
NEUTROPHILS # BLD: 4.3 10*3/UL (ref 1.4–6.5)
PLATELET # BLD AUTO: 187 K/MM3 (ref 130–400)
PMV BLD AUTO: 10.2 FL (ref 7.4–10.4)
POTASSIUM SERPL-SCNC: 3.8 MMOL/L (ref 3.4–5)
PROT SERPL-MCNC: 6.1 GM/DL (ref 6.4–8.2)
PROTHROMBIN TIME: 11 SECONDS (ref 9.7–12.8)
RBC # BLD AUTO: 3.84 M/MM3 (ref 4.2–5.6)
SODIUM SERPL-SCNC: 138 MMOL/L (ref 137–145)

## 2020-06-15 PROCEDURE — A9500 TC99M SESTAMIBI: HCPCS

## 2020-06-15 NOTE — NUR
New 22g IV started to LFA, flushes w/o difficulty. Pt eating lunch. Sotalol
given, Qtc 537, discussed w/ Macy and Dr. Dockery, ok to give, call if Wtc
>600.

## 2020-06-15 NOTE — NUR
Pt up to room 309 w/ wife at bedside, pt is A&O, independent in room, on room
air. Breathing is even and unlabored. pt on tele. Pt has IV to RFA, drainage
noted. Will redress or start new IV if not working. pt denies any pain at this
time. HR RRR. LS clear. Pulses strong bilaterally. Pt has scrape to LFA
covered w/ bandaid. No further needs noted at this time. Call light within
reach.

## 2020-06-15 NOTE — NUR
At time of assessment, patient is alert and oriented and has no complaints of
pain. Heart sounds are regular/normal, lungs are clear. He has 1+ pitting
edema in bilateral ankles/feet which he states is chronic for him. No new
concerns at this time. Will continue to monitor.

## 2020-06-15 NOTE — NUR
Pt sitting in recliner, watching tv. Pt denies any needs. RFA IV dc'd w/
catheter tip intact, no complications. Water provided per request. Call light
within reach.

## 2020-06-15 NOTE — NUR
met with patient and patient's wife Karen (ph#[phone]) to
discuss discharge planning. Patient lives in [place] with Karen and sees
Dr. Sweeney for primary care. Patient obtains medications from [facility]
with no difficulties. Patient denies DME usage and reports independence with
ADLS. Patient states he has DPOA-HC completed but SW did not locate copy in
EMR. Patient plans to return home at discharge. SW to continue to follow as
needed.

## 2020-06-16 VITALS — DIASTOLIC BLOOD PRESSURE: 51 MMHG | SYSTOLIC BLOOD PRESSURE: 141 MMHG | TEMPERATURE: 97.6 F | HEART RATE: 65 BPM

## 2020-06-16 VITALS — SYSTOLIC BLOOD PRESSURE: 107 MMHG | HEART RATE: 62 BPM | DIASTOLIC BLOOD PRESSURE: 58 MMHG | TEMPERATURE: 97.9 F

## 2020-06-16 VITALS — TEMPERATURE: 98 F | DIASTOLIC BLOOD PRESSURE: 55 MMHG | HEART RATE: 63 BPM | SYSTOLIC BLOOD PRESSURE: 140 MMHG

## 2020-06-16 VITALS — SYSTOLIC BLOOD PRESSURE: 141 MMHG | TEMPERATURE: 98.2 F | HEART RATE: 59 BPM | DIASTOLIC BLOOD PRESSURE: 77 MMHG

## 2020-06-16 VITALS — DIASTOLIC BLOOD PRESSURE: 69 MMHG | TEMPERATURE: 97.4 F | SYSTOLIC BLOOD PRESSURE: 143 MMHG | HEART RATE: 69 BPM

## 2020-06-16 LAB
ANION GAP SERPL CALC-SCNC: 4 MMOL/L (ref 7–16)
BASOPHILS # BLD: 0.1 10*3/UL (ref 0–0.2)
BASOPHILS NFR BLD AUTO: 1.8 % (ref 0–2)
BUN SERPL-MCNC: 39 MG/DL (ref 9–20)
CALCIUM SERPL-MCNC: 8.3 MG/DL (ref 8.4–10.2)
CHLORIDE SERPL-SCNC: 108 MMOL/L (ref 98–107)
CO2 SERPL-SCNC: 27 MMOL/L (ref 22–30)
CREAT SERPL-SCNC: 1.77 UMOL/L (ref 0.66–1.25)
EOSINOPHIL # BLD: 0.3 10*3/UL (ref 0–0.7)
EOSINOPHIL NFR BLD: 4.5 % (ref 0–4)
ERYTHROCYTE [DISTWIDTH] IN BLOOD BY AUTOMATED COUNT: 14.7 % (ref 11.5–14.5)
GLUCOSE SERPL-MCNC: 77 MG/DL (ref 74–106)
GRANULOCYTES # BLD AUTO: 52.6 % (ref 42.2–75.2)
HCT VFR BLD AUTO: 39 % (ref 42–52)
HGB BLD-MCNC: 12.5 G/DL (ref 13.5–18)
LYMPHOCYTES # BLD: 2 10*3/UL (ref 1.2–3.4)
LYMPHOCYTES NFR BLD: 29.8 % (ref 20–51)
MAGNESIUM SERPL-MCNC: 2.3 MG/DL (ref 1.6–2.3)
MCH RBC QN AUTO: 31 PG (ref 27–31)
MCHC RBC AUTO-ENTMCNC: 32 G/DL (ref 33–37)
MCV RBC AUTO: 96 FL (ref 80–100)
MONOCYTES # BLD: 0.7 10*3/UL (ref 0.1–0.6)
MONOCYTES NFR BLD AUTO: 10.6 % (ref 1.7–9.3)
NEUTROPHILS # BLD: 3.5 10*3/UL (ref 1.4–6.5)
PLATELET # BLD AUTO: 194 K/MM3 (ref 130–400)
PMV BLD AUTO: 10.6 FL (ref 7.4–10.4)
POTASSIUM SERPL-SCNC: 4.4 MMOL/L (ref 3.4–5)
RBC # BLD AUTO: 4.05 M/MM3 (ref 4.2–5.6)
SODIUM SERPL-SCNC: 139 MMOL/L (ref 137–145)

## 2020-06-16 NOTE — NUR
Patient has had an uneventful, restful night. His Qtc this morning is 618. Dr. Dockery will be notified.

## 2020-06-16 NOTE — NUR
Pt assessment completed. Pt resting in bed at this time watching television.
Pt alert and oriented x4. Pt denies pain at this time. INT to left forearm
patent and free of complications. Pt denies any other needs at this time. Call
light within reach. Will continue to monitor

## 2020-06-16 NOTE — NUR
Pt assessment completed and charted, alert, oriented, independent, and is on
roomair. I/V flushed with no complications. Morning meds provided as per MAR,
provided breakfast, tolerated well. No N/V/D, pain, numbness, tingling, edema,
SOB at this time as per pt. No further needs at this time.

## 2020-06-16 NOTE — NUR
SW met with the patient to revisit the discharge plan. The patient plans to
return home at discharge and does not questions or concerns about doing so.
There are no additional needs at this time.

## 2020-06-17 VITALS — SYSTOLIC BLOOD PRESSURE: 153 MMHG | DIASTOLIC BLOOD PRESSURE: 65 MMHG | HEART RATE: 61 BPM | TEMPERATURE: 97.7 F

## 2020-06-17 VITALS — DIASTOLIC BLOOD PRESSURE: 58 MMHG | SYSTOLIC BLOOD PRESSURE: 147 MMHG | TEMPERATURE: 97.8 F | HEART RATE: 64 BPM

## 2020-06-17 VITALS — HEART RATE: 64 BPM | TEMPERATURE: 97.5 F | DIASTOLIC BLOOD PRESSURE: 71 MMHG | SYSTOLIC BLOOD PRESSURE: 123 MMHG

## 2020-06-17 VITALS — SYSTOLIC BLOOD PRESSURE: 142 MMHG | TEMPERATURE: 97.8 F | HEART RATE: 70 BPM | DIASTOLIC BLOOD PRESSURE: 56 MMHG

## 2020-06-17 LAB
ANION GAP SERPL CALC-SCNC: 4 MMOL/L (ref 7–16)
BASOPHILS # BLD: 0.1 10*3/UL (ref 0–0.2)
BASOPHILS NFR BLD AUTO: 1.5 % (ref 0–2)
BUN SERPL-MCNC: 40 MG/DL (ref 9–20)
CALCIUM SERPL-MCNC: 8.5 MG/DL (ref 8.4–10.2)
CHLORIDE SERPL-SCNC: 106 MMOL/L (ref 98–107)
CO2 SERPL-SCNC: 28 MMOL/L (ref 22–30)
CREAT SERPL-SCNC: 1.79 UMOL/L (ref 0.66–1.25)
EOSINOPHIL # BLD: 0.3 10*3/UL (ref 0–0.7)
EOSINOPHIL NFR BLD: 4.5 % (ref 0–4)
ERYTHROCYTE [DISTWIDTH] IN BLOOD BY AUTOMATED COUNT: 14.6 % (ref 11.5–14.5)
GLUCOSE SERPL-MCNC: 85 MG/DL (ref 74–106)
GRANULOCYTES # BLD AUTO: 51.2 % (ref 42.2–75.2)
HCT VFR BLD AUTO: 40.1 % (ref 42–52)
HGB BLD-MCNC: 12.9 G/DL (ref 13.5–18)
LYMPHOCYTES # BLD: 2.4 10*3/UL (ref 1.2–3.4)
LYMPHOCYTES NFR BLD: 32 % (ref 20–51)
MAGNESIUM SERPL-MCNC: 2.3 MG/DL (ref 1.6–2.3)
MCH RBC QN AUTO: 32 PG (ref 27–31)
MCHC RBC AUTO-ENTMCNC: 32 G/DL (ref 33–37)
MCV RBC AUTO: 98 FL (ref 80–100)
MONOCYTES # BLD: 0.8 10*3/UL (ref 0.1–0.6)
MONOCYTES NFR BLD AUTO: 10.4 % (ref 1.7–9.3)
NEUTROPHILS # BLD: 3.8 10*3/UL (ref 1.4–6.5)
PLATELET # BLD AUTO: 195 K/MM3 (ref 130–400)
PMV BLD AUTO: 10.5 FL (ref 7.4–10.4)
POTASSIUM SERPL-SCNC: 4.5 MMOL/L (ref 3.4–5)
RBC # BLD AUTO: 4.1 M/MM3 (ref 4.2–5.6)
SODIUM SERPL-SCNC: 138 MMOL/L (ref 137–145)

## 2020-06-17 NOTE — NUR
Patient is alert and oriented. denies any pain. QTc this morning is 613. Dr Dockery was informed, he wants the next dose of Sodalol administered.

## 2020-06-17 NOTE — NUR
SW met with patient to revisit the discharge plan. The patient plans to return
home and does not have any concerns about doing so. There are no additional
needs at this time.

## 2020-06-17 NOTE — NUR
Pt had uneventful shift. Pt rested well throughout the night. No complaints of
pain. IV to left forearm free of complications. Pt denies any other needs at
this time. Call light within reach. Will continue to monitor.

## 2020-06-17 NOTE — NUR
Patient was discharge with order for Sotalol 80mg BID PO. INT discontinued.
Patient was given information about AFIB, Sotalol, and future appointment with
Cardiologist. Patient discharged with Wife.

## 2021-10-14 ENCOUNTER — HOSPITAL ENCOUNTER (INPATIENT)
Dept: HOSPITAL 19 - COL.ER | Age: 86
LOS: 6 days | Discharge: HOME | DRG: 871 | End: 2021-10-20
Attending: INTERNAL MEDICINE | Admitting: INTERNAL MEDICINE
Payer: MEDICARE

## 2021-10-14 VITALS — BODY MASS INDEX: 26.96 KG/M2 | WEIGHT: 161.82 LBS | HEIGHT: 65 IN

## 2021-10-14 VITALS — DIASTOLIC BLOOD PRESSURE: 66 MMHG | TEMPERATURE: 97.4 F | SYSTOLIC BLOOD PRESSURE: 129 MMHG | HEART RATE: 65 BPM

## 2021-10-14 DIAGNOSIS — I50.22: ICD-10-CM

## 2021-10-14 DIAGNOSIS — I42.9: ICD-10-CM

## 2021-10-14 DIAGNOSIS — E03.9: ICD-10-CM

## 2021-10-14 DIAGNOSIS — J96.01: ICD-10-CM

## 2021-10-14 DIAGNOSIS — N17.9: ICD-10-CM

## 2021-10-14 DIAGNOSIS — I13.0: ICD-10-CM

## 2021-10-14 DIAGNOSIS — Z66: ICD-10-CM

## 2021-10-14 DIAGNOSIS — T82.897A: ICD-10-CM

## 2021-10-14 DIAGNOSIS — N40.1: ICD-10-CM

## 2021-10-14 DIAGNOSIS — E78.5: ICD-10-CM

## 2021-10-14 DIAGNOSIS — Z86.73: ICD-10-CM
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
## 2021-10-14 DIAGNOSIS — I48.91: ICD-10-CM

## 2021-10-14 DIAGNOSIS — Z79.01: ICD-10-CM

## 2021-10-14 DIAGNOSIS — Z95.5: ICD-10-CM

## 2021-10-14 DIAGNOSIS — R73.9: ICD-10-CM

## 2021-10-14 DIAGNOSIS — Z95.2: ICD-10-CM

## 2021-10-14 DIAGNOSIS — M19.90: ICD-10-CM

## 2021-10-14 DIAGNOSIS — K21.9: ICD-10-CM

## 2021-10-14 DIAGNOSIS — Z20.822: ICD-10-CM

## 2021-10-14 DIAGNOSIS — A40.3: Primary | ICD-10-CM

## 2021-10-14 DIAGNOSIS — Z96.652: ICD-10-CM

## 2021-10-14 DIAGNOSIS — I25.10: ICD-10-CM

## 2021-10-14 DIAGNOSIS — D63.8: ICD-10-CM

## 2021-10-14 DIAGNOSIS — Z23: ICD-10-CM

## 2021-10-14 DIAGNOSIS — N18.30: ICD-10-CM

## 2021-10-14 DIAGNOSIS — E87.5: ICD-10-CM

## 2021-10-14 DIAGNOSIS — J44.1: ICD-10-CM

## 2021-10-14 DIAGNOSIS — N39.498: ICD-10-CM

## 2021-10-14 DIAGNOSIS — I34.0: ICD-10-CM

## 2021-10-14 DIAGNOSIS — Z95.0: ICD-10-CM

## 2021-10-14 LAB
ALBUMIN SERPL-MCNC: 2.6 GM/DL (ref 3.4–4.8)
ALP SERPL-CCNC: 52 U/L (ref 0–750)
ALT SERPL-CCNC: 10 U/L (ref 0–55)
ANION GAP SERPL CALC-SCNC: 10 MMOL/L (ref 7–16)
AST SERPL-CCNC: 24 U/L (ref 5–34)
BASOPHILS # BLD: 0 K/MM3 (ref 0–0.2)
BASOPHILS NFR BLD AUTO: 0.4 % (ref 0–2)
BILIRUB SERPL-MCNC: 0.7 MG/DL (ref 0.2–1.2)
BUN SERPL-MCNC: 49 MG/DL (ref 8–26)
CALCIUM SERPL-MCNC: 8.6 MG/DL (ref 8.4–10.2)
CHLORIDE SERPL-SCNC: 98 MMOL/L (ref 98–107)
CO2 SERPL-SCNC: 27 MMOL/L (ref 23–31)
CREAT SERPL-SCNC: 2.44 MG/DL (ref 0.72–1.25)
EOSINOPHIL # BLD: 0 K/MM3 (ref 0–0.7)
EOSINOPHIL NFR BLD: 0.1 % (ref 0–4)
ERYTHROCYTE [DISTWIDTH] IN BLOOD BY AUTOMATED COUNT: 14.7 % (ref 11.5–14.5)
GLUCOSE SERPL-MCNC: 125 MG/DL (ref 70–99)
GRANULOCYTES # BLD AUTO: 84.9 % (ref 42.2–75.2)
HCT VFR BLD AUTO: 28.6 % (ref 42–52)
HGB BLD-MCNC: 9.2 G/DL (ref 13.5–18)
LYMPHOCYTES # BLD: 0.7 K/MM3 (ref 1.2–3.4)
LYMPHOCYTES NFR BLD: 6.5 % (ref 20–51)
MCH RBC QN AUTO: 31 PG (ref 27–31)
MCHC RBC AUTO-ENTMCNC: 32 G/DL (ref 33–37)
MCV RBC AUTO: 97 FL (ref 80–100)
MONOCYTES # BLD: 0.8 K/MM3 (ref 0.1–0.6)
MONOCYTES NFR BLD AUTO: 7.2 % (ref 1.7–9.3)
NEUTROPHILS # BLD: 9.4 K/MM3 (ref 1.4–6.5)
PH UR STRIP.AUTO: 5 [PH] (ref 5–8)
PLATELET # BLD AUTO: 161 K/MM3 (ref 130–400)
PMV BLD AUTO: 10.1 FL (ref 7.4–10.4)
POTASSIUM SERPL-SCNC: 4.7 MMOL/L (ref 3.5–4.5)
PROT SERPL-MCNC: 5.7 GM/DL (ref 6.2–8.1)
RBC # BLD AUTO: 2.94 M/MM3 (ref 4.2–5.6)
RBC # UR STRIP.AUTO: (no result) /UL
RBC # UR: (no result) /HPF
SODIUM SERPL-SCNC: 135 MMOL/L (ref 136–145)
SP GR UR STRIP.AUTO: 1.01 (ref 1–1.03)
SQUAMOUS # URNS: (no result) /HPF
TROPONIN I SERPL-MCNC: 0.11 NG/ML (ref 0–0.03)
URN COLLECT METHOD CLASS: (no result)

## 2021-10-14 PROCEDURE — A9540 TC99M MAA: HCPCS

## 2021-10-14 PROCEDURE — A9567 TECHNETIUM TC-99M AEROSOL: HCPCS

## 2021-10-15 VITALS — SYSTOLIC BLOOD PRESSURE: 155 MMHG | DIASTOLIC BLOOD PRESSURE: 79 MMHG | TEMPERATURE: 97.6 F | HEART RATE: 63 BPM

## 2021-10-15 VITALS — HEART RATE: 65 BPM | DIASTOLIC BLOOD PRESSURE: 65 MMHG | SYSTOLIC BLOOD PRESSURE: 139 MMHG | TEMPERATURE: 98 F

## 2021-10-15 VITALS — TEMPERATURE: 97.3 F | SYSTOLIC BLOOD PRESSURE: 130 MMHG | HEART RATE: 59 BPM | DIASTOLIC BLOOD PRESSURE: 66 MMHG

## 2021-10-15 VITALS — DIASTOLIC BLOOD PRESSURE: 45 MMHG | HEART RATE: 62 BPM | TEMPERATURE: 98.2 F | SYSTOLIC BLOOD PRESSURE: 153 MMHG

## 2021-10-15 VITALS — SYSTOLIC BLOOD PRESSURE: 141 MMHG | TEMPERATURE: 97.6 F | DIASTOLIC BLOOD PRESSURE: 73 MMHG | HEART RATE: 67 BPM

## 2021-10-15 VITALS — HEART RATE: 70 BPM | SYSTOLIC BLOOD PRESSURE: 129 MMHG | DIASTOLIC BLOOD PRESSURE: 77 MMHG | TEMPERATURE: 97.7 F

## 2021-10-15 VITALS — TEMPERATURE: 97.5 F | HEART RATE: 62 BPM | SYSTOLIC BLOOD PRESSURE: 136 MMHG | DIASTOLIC BLOOD PRESSURE: 68 MMHG

## 2021-10-15 LAB
ANION GAP SERPL CALC-SCNC: 13 MMOL/L (ref 7–16)
APTT PPP: 28.8 SECONDS (ref 26–37)
BASOPHILS # BLD: 0 K/MM3 (ref 0–0.2)
BASOPHILS NFR BLD AUTO: 0.6 % (ref 0–2)
BUN SERPL-MCNC: 54 MG/DL (ref 8–26)
CALCIUM SERPL-MCNC: 8.8 MG/DL (ref 8.4–10.2)
CHLORIDE SERPL-SCNC: 99 MMOL/L (ref 98–107)
CO2 SERPL-SCNC: 24 MMOL/L (ref 23–31)
CREAT SERPL-SCNC: 2.31 MG/DL (ref 0.72–1.25)
EOSINOPHIL # BLD: 0 K/MM3 (ref 0–0.7)
EOSINOPHIL NFR BLD: 0 % (ref 0–4)
ERYTHROCYTE [DISTWIDTH] IN BLOOD BY AUTOMATED COUNT: 14.7 % (ref 11.5–14.5)
GLUCOSE SERPL-MCNC: 129 MG/DL (ref 70–99)
GRANULOCYTES # BLD AUTO: 87.8 % (ref 42.2–75.2)
HCT VFR BLD AUTO: 28.1 % (ref 42–52)
HGB BLD-MCNC: 9.2 G/DL (ref 13.5–18)
IRON SERPL-MCNC: 34 UG/DL (ref 35–150)
LYMPHOCYTES # BLD: 0.5 K/MM3 (ref 1.2–3.4)
LYMPHOCYTES NFR BLD: 8.3 % (ref 20–51)
MCH RBC QN AUTO: 32 PG (ref 27–31)
MCHC RBC AUTO-ENTMCNC: 33 G/DL (ref 33–37)
MCV RBC AUTO: 97 FL (ref 80–100)
MONOCYTES # BLD: 0.2 K/MM3 (ref 0.1–0.6)
MONOCYTES NFR BLD AUTO: 2.5 % (ref 1.7–9.3)
NEUTROPHILS # BLD: 5.6 K/MM3 (ref 1.4–6.5)
PLATELET # BLD AUTO: 179 K/MM3 (ref 130–400)
PMV BLD AUTO: 10.7 FL (ref 7.4–10.4)
POTASSIUM SERPL-SCNC: 4.5 MMOL/L (ref 3.5–4.5)
RBC # BLD AUTO: 2.91 M/MM3 (ref 4.2–5.6)
SODIUM SERPL-SCNC: 136 MMOL/L (ref 136–145)
TIBC SERPL-MCNC: 193 UG/DL (ref 261–462)

## 2021-10-15 NOTE — NUR
PT SLEPT MAJORITY OF THIS NIGHT, 02 4L NC, PT CONTINUES TO AMBULATE TO TOILET
AND DECLINES BEDSIDE COMMODE OR URINAL USE. PT UNSTEADY ON HIS FEET AND
REMAINS SBA. ALL MEDICATIONS ADMINISTERED AS ORDERED. I&O NOTED AND RECORDED.
PT EXPRESSES NO ADDITIONAL NEEDS AT THIS TIME. CALL LIGHT WITHIN REACH.

## 2021-10-15 NOTE — NUR
Assessment hcarted. Update provided to wife at bedside, called Hyacinth earlier
regarding concerns for antibiotic coverage and cardiac workup. Orders
implemented by Hyacinth. Will continue to monitor.

## 2021-10-15 NOTE — NUR
Primary nurse was assisted with [phone] patient care by [facility]
Essie Shah and [employer] instructor Jennifer Flannery MSN, RN

## 2021-10-15 NOTE — NUR
PT ARRIVED TO MEDICAL FLOOR ROOM 356 AT 2110 BY ED STAFF VIA BED PT ABLE TO
AMBULATE TO BED, PT A/OX4, 02 2L NC WITH SATURATIONS OVER 92 PERCENT, PT SOA
AND DYSNPEIC WITH AMBULATION.PT HAS UNSTEADY GAIT AND REQUIRES HIS CANE WHICH
IS AT HOME. PT NOTED AS FALL RISK, YELLOW GAIN, SOCKS, BRACELET ON.ASSESMENT
COMPLETED. MED REC COMPLETED WITHOUT MED LIST, THIS NURSE ATTEMPTED SEVERAL
TIMES TO CONTACT PTS WIFE TO RETRIEVE MEDICATION LIST, UNSUCCESFUL ATTEMPTS.
THIS NURSE WILL CONTINUE TO CONTACT WIFE AND RELAY TO ONCOMING SHIFT. PT
ORIENTED TO FLOOR, HOSPITAL POLICY. POC DISCUSSED WITH PT. PT VERBALIZES
UNDERSTANDING. ALL QUESTIONS/ CONCERNS ANSWERED. BED LOW. BED ALARM ON. CALL
LIGHT WITHIN REACH.

## 2021-10-15 NOTE — NUR
Pt remains very wheezy and it has increased, disucssed with MONI Zelaya and she
will take a look at things. Pt taking PO well, up to void often todya. Will
continue to monitor.

## 2021-10-16 VITALS — SYSTOLIC BLOOD PRESSURE: 143 MMHG | TEMPERATURE: 97.6 F | HEART RATE: 73 BPM | DIASTOLIC BLOOD PRESSURE: 48 MMHG

## 2021-10-16 VITALS — SYSTOLIC BLOOD PRESSURE: 163 MMHG | DIASTOLIC BLOOD PRESSURE: 49 MMHG | HEART RATE: 76 BPM | TEMPERATURE: 98.4 F

## 2021-10-16 VITALS — HEART RATE: 64 BPM | SYSTOLIC BLOOD PRESSURE: 154 MMHG | DIASTOLIC BLOOD PRESSURE: 48 MMHG | TEMPERATURE: 97.7 F

## 2021-10-16 VITALS — SYSTOLIC BLOOD PRESSURE: 142 MMHG | TEMPERATURE: 97.5 F | HEART RATE: 70 BPM | DIASTOLIC BLOOD PRESSURE: 57 MMHG

## 2021-10-16 VITALS — DIASTOLIC BLOOD PRESSURE: 54 MMHG | TEMPERATURE: 97.8 F | SYSTOLIC BLOOD PRESSURE: 155 MMHG | HEART RATE: 69 BPM

## 2021-10-16 LAB
ANION GAP SERPL CALC-SCNC: 12 MMOL/L (ref 7–16)
ANISOCYTOSIS BLD QL: (no result)
BUN SERPL-MCNC: 67 MG/DL (ref 8–26)
CALCIUM SERPL-MCNC: 8.3 MG/DL (ref 8.4–10.2)
CHLORIDE SERPL-SCNC: 101 MMOL/L (ref 98–107)
CO2 SERPL-SCNC: 26 MMOL/L (ref 23–31)
CREAT SERPL-SCNC: 2.09 MG/DL (ref 0.72–1.25)
ERYTHROCYTE [DISTWIDTH] IN BLOOD BY AUTOMATED COUNT: 14.8 % (ref 11.5–14.5)
GLUCOSE SERPL-MCNC: 138 MG/DL (ref 70–99)
HCT VFR BLD AUTO: 27.2 % (ref 42–52)
HGB BLD-MCNC: 9 G/DL (ref 13.5–18)
HYPOCHROMIA BLD QL SMEAR: (no result)
LYMPHOCYTES NFR BLD MANUAL: 3 % (ref 20–51)
MCH RBC QN AUTO: 32 PG (ref 27–31)
MCHC RBC AUTO-ENTMCNC: 33 G/DL (ref 33–37)
MCV RBC AUTO: 95 FL (ref 80–100)
MONOCYTES NFR BLD: 6 % (ref 1.7–9.3)
NEUTS SEG NFR BLD MANUAL: 91 % (ref 42–75.2)
OVALOCYTES BLD QL SMEAR: (no result)
PLATELET # BLD AUTO: 206 K/MM3 (ref 130–400)
PLATELET BLD QL SMEAR: NORMAL
PMV BLD AUTO: 10.5 FL (ref 7.4–10.4)
POTASSIUM SERPL-SCNC: 4 MMOL/L (ref 3.5–4.5)
RBC # BLD AUTO: 2.85 M/MM3 (ref 4.2–5.6)
SCHISTOCYTES BLD QL SMEAR: (no result)
SODIUM SERPL-SCNC: 139 MMOL/L (ref 136–145)

## 2021-10-16 NOTE — NUR
Pt awake upon entry, spouse in room with Pt.  No C/O pain at this time.  Shift
assessment complete, left Pt in bed, lowest position, call light in reach.

## 2021-10-16 NOTE — NUR
Plan is to return homw in [facility].
SW met with patient about care. Patient gave permission to speak in front of
wife about care. Karen [phone] wife. Patient reports pcp Dr. Sweeney
and that he uses a walker, cane and has a pacemaker. Other specialist are Dr. Dockery, No poa on file but wll default to wife. Patient reports that he does not
have any care concerns and is not in any pain. Patient reports that he is not
really sure what is going on with him. Educated on supports to case
management. Will follow.

## 2021-10-16 NOTE — NUR
PT AMBULATED TO BATHROOM, DUMPED URINE BEFORE ABLE TO CHART OUTPUT. PT DID NOT
HAVE OXYGEN ON STATING "I DON'T NEED OXYGEN" OXYGEN REPLACED AND PT RETURNED
TO BED. SPO2 AT 93%.

## 2021-10-17 VITALS — DIASTOLIC BLOOD PRESSURE: 64 MMHG | SYSTOLIC BLOOD PRESSURE: 119 MMHG | HEART RATE: 85 BPM | TEMPERATURE: 98.4 F

## 2021-10-17 VITALS — HEART RATE: 65 BPM | DIASTOLIC BLOOD PRESSURE: 50 MMHG | SYSTOLIC BLOOD PRESSURE: 165 MMHG | TEMPERATURE: 97.9 F

## 2021-10-17 VITALS — HEART RATE: 76 BPM | DIASTOLIC BLOOD PRESSURE: 47 MMHG | SYSTOLIC BLOOD PRESSURE: 152 MMHG | TEMPERATURE: 97.8 F

## 2021-10-17 VITALS — SYSTOLIC BLOOD PRESSURE: 128 MMHG | HEART RATE: 66 BPM | TEMPERATURE: 97.9 F | DIASTOLIC BLOOD PRESSURE: 59 MMHG

## 2021-10-17 VITALS — TEMPERATURE: 97.6 F | SYSTOLIC BLOOD PRESSURE: 124 MMHG | DIASTOLIC BLOOD PRESSURE: 65 MMHG | HEART RATE: 68 BPM

## 2021-10-17 VITALS — SYSTOLIC BLOOD PRESSURE: 141 MMHG | DIASTOLIC BLOOD PRESSURE: 57 MMHG | TEMPERATURE: 97.6 F | HEART RATE: 66 BPM

## 2021-10-17 VITALS — HEART RATE: 89 BPM | DIASTOLIC BLOOD PRESSURE: 70 MMHG | SYSTOLIC BLOOD PRESSURE: 158 MMHG | TEMPERATURE: 98.4 F

## 2021-10-17 LAB
ANION GAP SERPL CALC-SCNC: 10 MMOL/L (ref 7–16)
BUN SERPL-MCNC: 73 MG/DL (ref 8–26)
CALCIUM SERPL-MCNC: 7.9 MG/DL (ref 8.4–10.2)
CHLORIDE SERPL-SCNC: 101 MMOL/L (ref 98–107)
CO2 SERPL-SCNC: 28 MMOL/L (ref 23–31)
CREAT SERPL-SCNC: 2.02 MG/DL (ref 0.72–1.25)
ERYTHROCYTE [DISTWIDTH] IN BLOOD BY AUTOMATED COUNT: 15 % (ref 11.5–14.5)
GLUCOSE SERPL-MCNC: 127 MG/DL (ref 70–99)
HCT VFR BLD AUTO: 25.9 % (ref 42–52)
HGB BLD-MCNC: 8.6 G/DL (ref 13.5–18)
MCH RBC QN AUTO: 32 PG (ref 27–31)
MCHC RBC AUTO-ENTMCNC: 33 G/DL (ref 33–37)
MCV RBC AUTO: 97 FL (ref 80–100)
PLATELET # BLD AUTO: 200 K/MM3 (ref 130–400)
PMV BLD AUTO: 10.9 FL (ref 7.4–10.4)
POTASSIUM SERPL-SCNC: 3.6 MMOL/L (ref 3.5–4.5)
RBC # BLD AUTO: 2.68 M/MM3 (ref 4.2–5.6)
SODIUM SERPL-SCNC: 139 MMOL/L (ref 136–145)

## 2021-10-17 NOTE — NUR
Pt awake upon entry to room, no C/O pain at this time.  Assisted Pt with
ordering breakfast.  Shift assessment complete, left Pt call light in reach,
bed in lowest position.

## 2021-10-18 VITALS — HEART RATE: 73 BPM | TEMPERATURE: 97.5 F | SYSTOLIC BLOOD PRESSURE: 119 MMHG | DIASTOLIC BLOOD PRESSURE: 90 MMHG

## 2021-10-18 VITALS — HEART RATE: 75 BPM | DIASTOLIC BLOOD PRESSURE: 71 MMHG | TEMPERATURE: 97.4 F | SYSTOLIC BLOOD PRESSURE: 96 MMHG

## 2021-10-18 VITALS — SYSTOLIC BLOOD PRESSURE: 149 MMHG | HEART RATE: 61 BPM | DIASTOLIC BLOOD PRESSURE: 46 MMHG | TEMPERATURE: 98.2 F

## 2021-10-18 VITALS — DIASTOLIC BLOOD PRESSURE: 60 MMHG | SYSTOLIC BLOOD PRESSURE: 114 MMHG | HEART RATE: 66 BPM | TEMPERATURE: 97.5 F

## 2021-10-18 VITALS — HEART RATE: 63 BPM | TEMPERATURE: 98.7 F | DIASTOLIC BLOOD PRESSURE: 42 MMHG | SYSTOLIC BLOOD PRESSURE: 148 MMHG

## 2021-10-18 LAB
ANION GAP SERPL CALC-SCNC: 13 MMOL/L (ref 7–16)
BUN SERPL-MCNC: 76 MG/DL (ref 8–26)
CALCIUM SERPL-MCNC: 8.1 MG/DL (ref 8.4–10.2)
CHLORIDE SERPL-SCNC: 101 MMOL/L (ref 98–107)
CO2 SERPL-SCNC: 26 MMOL/L (ref 23–31)
CREAT SERPL-SCNC: 2.27 MG/DL (ref 0.72–1.25)
ERYTHROCYTE [DISTWIDTH] IN BLOOD BY AUTOMATED COUNT: 15 % (ref 11.5–14.5)
GLUCOSE SERPL-MCNC: 122 MG/DL (ref 70–99)
HCT VFR BLD AUTO: 25.9 % (ref 42–52)
HGB BLD-MCNC: 8.6 G/DL (ref 13.5–18)
LYMPHOCYTES NFR BLD MANUAL: 7 % (ref 20–51)
MCH RBC QN AUTO: 31 PG (ref 27–31)
MCHC RBC AUTO-ENTMCNC: 33 G/DL (ref 33–37)
MCV RBC AUTO: 95 FL (ref 80–100)
MONOCYTES NFR BLD: 4 % (ref 1.7–9.3)
NEUTS BAND NFR BLD: 8 % (ref 0–10)
NEUTS SEG NFR BLD MANUAL: 81 % (ref 42–75.2)
OVALOCYTES BLD QL SMEAR: (no result)
PLATELET # BLD AUTO: 237 K/MM3 (ref 130–400)
PLATELET BLD QL SMEAR: NORMAL
PMV BLD AUTO: 10.8 FL (ref 7.4–10.4)
POTASSIUM SERPL-SCNC: 3.6 MMOL/L (ref 3.5–4.5)
RBC # BLD AUTO: 2.74 M/MM3 (ref 4.2–5.6)
SCHISTOCYTES BLD QL SMEAR: (no result)
SODIUM SERPL-SCNC: 140 MMOL/L (ref 136–145)

## 2021-10-18 NOTE — NUR
PT HAD UNEVENTFUL NIGHT. HEP GTT INFUSING AT 12ML/HR NEXT HEPXA AT 0715 THIS
MORNING. 02 ROOM AIR WITH SATURATIONS OVER 92 PERCENT. ALL MEDICATIONS
ADMINISTERED AS ORDERED. PT EXPRESSES NO ADDITIONAL NEEDS AT THIS TIME. CALL
LIGHT WITHIN REACH.

## 2021-10-18 NOTE — NUR
Pt awake upon entry, sitting in the recliner.  No C/O pain at this time.
Shift assessments complete, left Pt call light in reach.

## 2021-10-19 VITALS — DIASTOLIC BLOOD PRESSURE: 56 MMHG | HEART RATE: 69 BPM | SYSTOLIC BLOOD PRESSURE: 118 MMHG | TEMPERATURE: 97.9 F

## 2021-10-19 VITALS — SYSTOLIC BLOOD PRESSURE: 119 MMHG | TEMPERATURE: 97.5 F | DIASTOLIC BLOOD PRESSURE: 61 MMHG | HEART RATE: 71 BPM

## 2021-10-19 VITALS — DIASTOLIC BLOOD PRESSURE: 63 MMHG | HEART RATE: 72 BPM | SYSTOLIC BLOOD PRESSURE: 138 MMHG | TEMPERATURE: 97.8 F

## 2021-10-19 VITALS — HEART RATE: 76 BPM | DIASTOLIC BLOOD PRESSURE: 50 MMHG | TEMPERATURE: 97.4 F | SYSTOLIC BLOOD PRESSURE: 160 MMHG

## 2021-10-19 VITALS — SYSTOLIC BLOOD PRESSURE: 152 MMHG | DIASTOLIC BLOOD PRESSURE: 48 MMHG | TEMPERATURE: 97.5 F | HEART RATE: 80 BPM

## 2021-10-19 VITALS — DIASTOLIC BLOOD PRESSURE: 67 MMHG | HEART RATE: 82 BPM | SYSTOLIC BLOOD PRESSURE: 101 MMHG | TEMPERATURE: 97.8 F

## 2021-10-19 LAB
ANION GAP SERPL CALC-SCNC: 12 MMOL/L (ref 7–16)
BUN SERPL-MCNC: 73 MG/DL (ref 8–26)
CALCIUM SERPL-MCNC: 7.9 MG/DL (ref 8.4–10.2)
CHLORIDE SERPL-SCNC: 101 MMOL/L (ref 98–107)
CO2 SERPL-SCNC: 28 MMOL/L (ref 23–31)
CREAT SERPL-SCNC: 2.02 MG/DL (ref 0.72–1.25)
ERYTHROCYTE [DISTWIDTH] IN BLOOD BY AUTOMATED COUNT: 15.1 % (ref 11.5–14.5)
GLUCOSE SERPL-MCNC: 89 MG/DL (ref 70–99)
HCT VFR BLD AUTO: 26 % (ref 42–52)
HGB BLD-MCNC: 8.6 G/DL (ref 13.5–18)
LYMPHOCYTES NFR BLD MANUAL: 24 % (ref 20–51)
MCH RBC QN AUTO: 32 PG (ref 27–31)
MCHC RBC AUTO-ENTMCNC: 33 G/DL (ref 33–37)
MCV RBC AUTO: 96 FL (ref 80–100)
METAMYELOCYTES NFR BLD MANUAL: 1 % (ref 0–0)
MONOCYTES NFR BLD: 7 % (ref 1.7–9.3)
NEUTS BAND NFR BLD: 1 % (ref 0–10)
NEUTS SEG NFR BLD MANUAL: 67 % (ref 42–75.2)
OVALOCYTES BLD QL SMEAR: (no result)
PLATELET # BLD AUTO: 251 K/MM3 (ref 130–400)
PLATELET BLD QL SMEAR: NORMAL
PMV BLD AUTO: 10.5 FL (ref 7.4–10.4)
POTASSIUM SERPL-SCNC: 3.6 MMOL/L (ref 3.5–4.5)
RBC # BLD AUTO: 2.71 M/MM3 (ref 4.2–5.6)
SODIUM SERPL-SCNC: 141 MMOL/L (ref 136–145)

## 2021-10-19 NOTE — NUR
PT LAYING IN BED IN PLEASANT MOOD. PT SEEMED SLIGHTLY CONFUSED AND NOT
ORIENTATED TO TIME. DURING MED PASS, PATIENT KEPT STATING "SCHEDULE SAYS THESE
IN THE MORNING" THIS RN REORIENTATED PT TO TIME AND APPROPRIATE TIME FOR
MEDICATION. PT  AT BEDSIDE, PT  BROUGHT PT COFFEE THIS MORNING
WITHOUT THIS RN NOTICING, PT AND DR NOTIFIED OF NEEDING TO RESCHEDULE KEV DUE
TO CAFFEINE. WILL RE EDUCATE ABOUT NPO STATUS AT MIDNIGHT WITH NO CAFFEINE
TOMORROW.

## 2021-10-19 NOTE — NUR
PT HAS BEEN PLEASANT ALL DAY. DENIES ANY PAIN OR SOB. PT HAS COUGH, BUT DENIES
ANY DIFFICULTY. TOOK ALL MEDS AS PRESCRIBED. PT WIFE AT BEDSIDE MOST OF DAY
AND BOTH INVOLVED IN CARE.

## 2021-10-19 NOTE — NUR
Patient is in bed, alert and oriented, VSS, reports no SOB, wheezes with
exalation. Tele in place, paced, 1500FR, Reports no pain, nausea or
vomiting. Assessmente completed, no further needs at this time, call light
within reach.

## 2021-10-20 VITALS — SYSTOLIC BLOOD PRESSURE: 116 MMHG | HEART RATE: 78 BPM | TEMPERATURE: 97.4 F | DIASTOLIC BLOOD PRESSURE: 69 MMHG

## 2021-10-20 VITALS — DIASTOLIC BLOOD PRESSURE: 58 MMHG | HEART RATE: 68 BPM | SYSTOLIC BLOOD PRESSURE: 104 MMHG | TEMPERATURE: 97.6 F

## 2021-10-20 VITALS — SYSTOLIC BLOOD PRESSURE: 134 MMHG | DIASTOLIC BLOOD PRESSURE: 75 MMHG | HEART RATE: 89 BPM | TEMPERATURE: 97.7 F

## 2021-10-20 VITALS — DIASTOLIC BLOOD PRESSURE: 57 MMHG | HEART RATE: 63 BPM | SYSTOLIC BLOOD PRESSURE: 128 MMHG | TEMPERATURE: 97.5 F

## 2021-10-20 LAB
ANION GAP SERPL CALC-SCNC: 10 MMOL/L (ref 7–16)
ANISOCYTOSIS BLD QL: (no result)
BUN SERPL-MCNC: 64 MG/DL (ref 8–26)
CALCIUM SERPL-MCNC: 7.8 MG/DL (ref 8.4–10.2)
CHLORIDE SERPL-SCNC: 103 MMOL/L (ref 98–107)
CO2 SERPL-SCNC: 28 MMOL/L (ref 23–31)
CREAT SERPL-SCNC: 1.86 MG/DL (ref 0.72–1.25)
EOSINOPHIL NFR BLD: 1 % (ref 0–4)
ERYTHROCYTE [DISTWIDTH] IN BLOOD BY AUTOMATED COUNT: 15.2 % (ref 11.5–14.5)
GLUCOSE SERPL-MCNC: 80 MG/DL (ref 70–99)
HCT VFR BLD AUTO: 26.1 % (ref 42–52)
HGB BLD-MCNC: 8.8 G/DL (ref 13.5–18)
HYPOCHROMIA BLD QL SMEAR: (no result)
LYMPHOCYTES NFR BLD MANUAL: 19 % (ref 20–51)
MCH RBC QN AUTO: 32 PG (ref 27–31)
MCHC RBC AUTO-ENTMCNC: 34 G/DL (ref 33–37)
MCV RBC AUTO: 94 FL (ref 80–100)
MONOCYTES NFR BLD: 6 % (ref 1.7–9.3)
NEUTS SEG NFR BLD MANUAL: 74 % (ref 42–75.2)
PLATELET # BLD AUTO: 248 K/MM3 (ref 130–400)
PLATELET BLD QL SMEAR: NORMAL
PMV BLD AUTO: 10.8 FL (ref 7.4–10.4)
POTASSIUM SERPL-SCNC: 3.9 MMOL/L (ref 3.5–4.5)
RBC # BLD AUTO: 2.78 M/MM3 (ref 4.2–5.6)
SODIUM SERPL-SCNC: 141 MMOL/L (ref 136–145)

## 2021-10-20 NOTE — NUR
Discharge orders discussed with patient and his wife, instructed to follow up
with PCP/cards/ pulm as we have scheduled for him, IV and tele removed,
home O2 being ordered by Social work, waiting for portable tank to be
delivered, I will escort the patient out the door when he is ready

## 2021-10-20 NOTE — NUR
Collaborated with OT and patient will need 2Lt home O2.
Met with patient and patients wife at bedside to notify and obtain a perfered
company. Patient and wife in agreement to ssetting up home O2 through [facility].
Order obtained from MONI Ferguson. Husam at [facility] contacted and orders faxed to
them. Husam informed me that they probably will not be able to deliver it to
the hospital until later in the day.

## 2022-02-23 ENCOUNTER — HOSPITAL ENCOUNTER (OUTPATIENT)
Dept: HOSPITAL 19 - COL.ER | Age: 87
Setting detail: OBSERVATION
LOS: 2 days | Discharge: HOME | End: 2022-02-25
Attending: STUDENT IN AN ORGANIZED HEALTH CARE EDUCATION/TRAINING PROGRAM | Admitting: STUDENT IN AN ORGANIZED HEALTH CARE EDUCATION/TRAINING PROGRAM
Payer: MEDICARE

## 2022-02-23 VITALS — BODY MASS INDEX: 23.56 KG/M2 | WEIGHT: 155.43 LBS | HEIGHT: 68 IN

## 2022-02-23 VITALS — TEMPERATURE: 97.8 F | SYSTOLIC BLOOD PRESSURE: 139 MMHG | DIASTOLIC BLOOD PRESSURE: 81 MMHG | HEART RATE: 68 BPM

## 2022-02-23 DIAGNOSIS — Z79.899: ICD-10-CM

## 2022-02-23 DIAGNOSIS — I25.110: Primary | ICD-10-CM

## 2022-02-23 DIAGNOSIS — Z95.2: ICD-10-CM

## 2022-02-23 DIAGNOSIS — I50.9: ICD-10-CM

## 2022-02-23 DIAGNOSIS — I13.0: ICD-10-CM

## 2022-02-23 DIAGNOSIS — I48.91: ICD-10-CM

## 2022-02-23 DIAGNOSIS — D63.1: ICD-10-CM

## 2022-02-23 DIAGNOSIS — K21.9: ICD-10-CM

## 2022-02-23 DIAGNOSIS — Z86.73: ICD-10-CM

## 2022-02-23 DIAGNOSIS — E03.9: ICD-10-CM

## 2022-02-23 DIAGNOSIS — Z79.82: ICD-10-CM

## 2022-02-23 DIAGNOSIS — I08.0: ICD-10-CM

## 2022-02-23 DIAGNOSIS — I49.5: ICD-10-CM

## 2022-02-23 DIAGNOSIS — N18.9: ICD-10-CM

## 2022-02-23 DIAGNOSIS — E87.5: ICD-10-CM

## 2022-02-23 DIAGNOSIS — Z87.891: ICD-10-CM

## 2022-02-23 DIAGNOSIS — Z95.5: ICD-10-CM

## 2022-02-23 DIAGNOSIS — I21.4: ICD-10-CM

## 2022-02-23 DIAGNOSIS — E78.5: ICD-10-CM

## 2022-02-23 DIAGNOSIS — R32: ICD-10-CM

## 2022-02-23 DIAGNOSIS — J44.9: ICD-10-CM

## 2022-02-23 LAB
ALBUMIN SERPL-MCNC: 3.2 GM/DL (ref 3.4–4.8)
ALP SERPL-CCNC: 56 U/L (ref 40–150)
ALT SERPL-CCNC: 10 U/L (ref 0–55)
ANION GAP SERPL CALC-SCNC: 13 MMOL/L (ref 7–16)
APTT PPP: 27.7 SECONDS (ref 26–37)
AST SERPL-CCNC: 18 U/L (ref 5–34)
BASOPHILS # BLD: 0.1 K/MM3 (ref 0–0.2)
BASOPHILS NFR BLD AUTO: 0.9 % (ref 0–2)
BILIRUB SERPL-MCNC: 0.4 MG/DL (ref 0.2–1.2)
BUN SERPL-MCNC: 48 MG/DL (ref 8–26)
CALCIUM SERPL-MCNC: 8.5 MG/DL (ref 8.4–10.2)
CHLORIDE SERPL-SCNC: 103 MMOL/L (ref 98–107)
CO2 SERPL-SCNC: 24 MMOL/L (ref 23–31)
CREAT SERPL-SCNC: 1.91 MG/DL (ref 0.72–1.25)
EOSINOPHIL # BLD: 0.3 K/MM3 (ref 0–0.7)
EOSINOPHIL NFR BLD: 2.6 % (ref 0–4)
ERYTHROCYTE [DISTWIDTH] IN BLOOD BY AUTOMATED COUNT: 14.6 % (ref 11.5–14.5)
GLUCOSE SERPL-MCNC: 97 MG/DL (ref 70–99)
GLUCOSE UR QL STRIP.AUTO: (no result)
GRANULOCYTES # BLD AUTO: 53.3 % (ref 42.2–75.2)
HCT VFR BLD AUTO: 38 % (ref 42–52)
HGB BLD-MCNC: 12 G/DL (ref 13.5–18)
INR BLD: 1 (ref 0.8–3)
LIPASE SERPL-CCNC: 18 U/L (ref 8–78)
LYMPHOCYTES # BLD: 3.1 K/MM3 (ref 1.2–3.4)
LYMPHOCYTES NFR BLD: 32.6 % (ref 20–51)
MCH RBC QN AUTO: 30 PG (ref 27–31)
MCHC RBC AUTO-ENTMCNC: 32 G/DL (ref 33–37)
MCV RBC AUTO: 95 FL (ref 80–100)
MONOCYTES # BLD: 1 K/MM3 (ref 0.1–0.6)
MONOCYTES NFR BLD AUTO: 10.2 % (ref 1.7–9.3)
NEUTROPHILS # BLD: 5.1 K/MM3 (ref 1.4–6.5)
PH UR STRIP.AUTO: 7 [PH] (ref 5–8)
PLATELET # BLD AUTO: 250 K/MM3 (ref 130–400)
PMV BLD AUTO: 10.6 FL (ref 7.4–10.4)
POTASSIUM SERPL-SCNC: 4.7 MMOL/L (ref 3.5–4.5)
PROT SERPL-MCNC: 6.1 GM/DL (ref 6.2–8.1)
PROTHROMBIN TIME: 10.7 SECONDS (ref 9.7–12.8)
RBC # BLD AUTO: 3.99 M/MM3 (ref 4.2–5.6)
RBC # UR STRIP.AUTO: (no result) /UL
RBC # UR: (no result) /HPF (ref 0–2)
SODIUM SERPL-SCNC: 140 MMOL/L (ref 136–145)
SP GR UR STRIP.AUTO: 1.01 (ref 1–1.03)
TROPONIN I SERPL-MCNC: 0.06 NG/ML (ref 0–0.03)
URN COLLECT METHOD CLASS: (no result)

## 2022-02-23 PROCEDURE — C1769 GUIDE WIRE: HCPCS

## 2022-02-23 PROCEDURE — A9270 NON-COVERED ITEM OR SERVICE: HCPCS

## 2022-02-23 PROCEDURE — C1760 CLOSURE DEV, VASC: HCPCS

## 2022-02-23 PROCEDURE — C1894 INTRO/SHEATH, NON-LASER: HCPCS

## 2022-02-23 PROCEDURE — G0378 HOSPITAL OBSERVATION PER HR: HCPCS

## 2022-02-23 NOTE — NUR
PT ADMITTED TO ROOM 330 FOR CHEST PAIN AND SOB. A&0X4. PT DENIES CHEST EMEKA AT
THIS TIME. SEE NEW ORDERS. CALL LIGHT IN REACH.

## 2022-02-24 VITALS — SYSTOLIC BLOOD PRESSURE: 152 MMHG | DIASTOLIC BLOOD PRESSURE: 49 MMHG | HEART RATE: 59 BPM

## 2022-02-24 VITALS — SYSTOLIC BLOOD PRESSURE: 142 MMHG | DIASTOLIC BLOOD PRESSURE: 88 MMHG | HEART RATE: 34 BPM

## 2022-02-24 VITALS — SYSTOLIC BLOOD PRESSURE: 141 MMHG | HEART RATE: 60 BPM | DIASTOLIC BLOOD PRESSURE: 49 MMHG

## 2022-02-24 VITALS — SYSTOLIC BLOOD PRESSURE: 141 MMHG | DIASTOLIC BLOOD PRESSURE: 49 MMHG | TEMPERATURE: 97.6 F | HEART RATE: 60 BPM

## 2022-02-24 VITALS — DIASTOLIC BLOOD PRESSURE: 45 MMHG | SYSTOLIC BLOOD PRESSURE: 149 MMHG | TEMPERATURE: 97.9 F | HEART RATE: 62 BPM

## 2022-02-24 VITALS — DIASTOLIC BLOOD PRESSURE: 43 MMHG | TEMPERATURE: 97.6 F | SYSTOLIC BLOOD PRESSURE: 146 MMHG | HEART RATE: 71 BPM

## 2022-02-24 VITALS — DIASTOLIC BLOOD PRESSURE: 44 MMHG | HEART RATE: 70 BPM | SYSTOLIC BLOOD PRESSURE: 142 MMHG | TEMPERATURE: 97.4 F

## 2022-02-24 VITALS — DIASTOLIC BLOOD PRESSURE: 51 MMHG | SYSTOLIC BLOOD PRESSURE: 155 MMHG | TEMPERATURE: 97.5 F | HEART RATE: 70 BPM

## 2022-02-24 VITALS — HEART RATE: 62 BPM | SYSTOLIC BLOOD PRESSURE: 145 MMHG | DIASTOLIC BLOOD PRESSURE: 43 MMHG

## 2022-02-24 VITALS — HEART RATE: 62 BPM | DIASTOLIC BLOOD PRESSURE: 55 MMHG | TEMPERATURE: 97.4 F | SYSTOLIC BLOOD PRESSURE: 105 MMHG

## 2022-02-24 VITALS — DIASTOLIC BLOOD PRESSURE: 63 MMHG | TEMPERATURE: 98.2 F | HEART RATE: 76 BPM | SYSTOLIC BLOOD PRESSURE: 102 MMHG

## 2022-02-24 VITALS — TEMPERATURE: 97.3 F | SYSTOLIC BLOOD PRESSURE: 163 MMHG | DIASTOLIC BLOOD PRESSURE: 46 MMHG | HEART RATE: 58 BPM

## 2022-02-24 VITALS — DIASTOLIC BLOOD PRESSURE: 40 MMHG | HEART RATE: 61 BPM | SYSTOLIC BLOOD PRESSURE: 157 MMHG

## 2022-02-24 VITALS — TEMPERATURE: 97.5 F | SYSTOLIC BLOOD PRESSURE: 156 MMHG | HEART RATE: 60 BPM | DIASTOLIC BLOOD PRESSURE: 40 MMHG

## 2022-02-24 VITALS — DIASTOLIC BLOOD PRESSURE: 45 MMHG | SYSTOLIC BLOOD PRESSURE: 139 MMHG | HEART RATE: 59 BPM

## 2022-02-24 VITALS — HEART RATE: 62 BPM | DIASTOLIC BLOOD PRESSURE: 51 MMHG | SYSTOLIC BLOOD PRESSURE: 140 MMHG

## 2022-02-24 LAB
CHOLEST SPEC-SCNC: 125 MG/DL (ref 0–199)
CHOLEST/HDLC SERPL-SRTO: 3.4
HDLC SERPL-MCNC: 36 MG/DL (ref 40–60)
LDLC SERPL-MCNC: 73 MG/DL
TRIGL SERPL-MCNC: 78 MG/DL (ref 0–149)
TROPONIN I SERPL-MCNC: 0.09 NG/ML (ref 0–0.03)

## 2022-02-24 NOTE — NUR
NO BLEEDING OBSERVED TO R RADIAL CATH PUNCTURE SITE, REMOVED RADIAL BAND,
BANDAID APPLIED. WRIST BOARD REMOVED.

## 2022-02-24 NOTE — NUR
Social Work student attempted to locate a DPOA-HC for [facility] from his PCP
office. Bessie with Dr. Sweeney was on phone chuckie , and stated she did not
have one on file for the patient.

## 2022-02-24 NOTE — NUR
Pt assessment complete. Pt resting in bed upon entry, he is A/O x4. His
breathing is even and unlabored on RA. Pt denies SOB. No chest pain at this
time. Pt states "I feel pretty good this morning". Denies any dizziness.
Heparin infusing per protocol. POC discussed with patient as well as NPO
orders, pt verbalized understanding. No further needs at this time. Call light
within reach.

## 2022-02-24 NOTE — NUR
Attempted to deflate radial band by 5mls, bleeding present. Reinserted air.
Site to R groin CDI. Pt still bedrest at this time. Has no concerns. Call
light within reach.

## 2022-02-24 NOTE — NUR
met with patient to discuss discharge plan. Patient's wife
Karen ([phone]) present at bedside. Patient and wife both live at home
in [place]. Patient is independent with his ADL's and utilizes a cane to
assist with ambulation. Upon last dc, patient was established with home oxygen
through [facility], but has since stopped needing home oxygen. PCP is Dr. Sweeney
and [facility].
Patient reports that he does have a DPOA-HC established and that the hospial
should have a copy. Patient's EMR does not have a copy. MELINDA Cline
contacted PCP office and LM for [facility] to see if they have a copy.
 
Discharge plan: Home; pending PT/OT [place]

## 2022-02-25 VITALS — TEMPERATURE: 97.6 F | HEART RATE: 59 BPM | DIASTOLIC BLOOD PRESSURE: 41 MMHG | SYSTOLIC BLOOD PRESSURE: 153 MMHG

## 2022-02-25 VITALS — SYSTOLIC BLOOD PRESSURE: 105 MMHG | DIASTOLIC BLOOD PRESSURE: 51 MMHG | TEMPERATURE: 97.5 F | HEART RATE: 62 BPM

## 2022-02-25 VITALS — SYSTOLIC BLOOD PRESSURE: 150 MMHG | HEART RATE: 65 BPM | DIASTOLIC BLOOD PRESSURE: 43 MMHG | TEMPERATURE: 97.3 F

## 2022-02-25 VITALS — SYSTOLIC BLOOD PRESSURE: 125 MMHG | TEMPERATURE: 97.5 F | HEART RATE: 70 BPM | DIASTOLIC BLOOD PRESSURE: 41 MMHG

## 2022-02-25 LAB
ANION GAP SERPL CALC-SCNC: 9 MMOL/L (ref 7–16)
BASOPHILS # BLD: 0.1 K/MM3 (ref 0–0.2)
BASOPHILS NFR BLD AUTO: 0.7 % (ref 0–2)
BUN SERPL-MCNC: 36 MG/DL (ref 8–26)
CALCIUM SERPL-MCNC: 7.9 MG/DL (ref 8.4–10.2)
CHLORIDE SERPL-SCNC: 107 MMOL/L (ref 98–107)
CO2 SERPL-SCNC: 22 MMOL/L (ref 23–31)
CREAT SERPL-SCNC: 1.59 MG/DL (ref 0.72–1.25)
EOSINOPHIL # BLD: 0.3 K/MM3 (ref 0–0.7)
EOSINOPHIL NFR BLD: 3.4 % (ref 0–4)
ERYTHROCYTE [DISTWIDTH] IN BLOOD BY AUTOMATED COUNT: 14.6 % (ref 11.5–14.5)
GLUCOSE SERPL-MCNC: 82 MG/DL (ref 70–99)
GRANULOCYTES # BLD AUTO: 51.2 % (ref 42.2–75.2)
HCT VFR BLD AUTO: 33.7 % (ref 42–52)
HGB BLD-MCNC: 10.7 G/DL (ref 13.5–18)
LYMPHOCYTES # BLD: 2.8 K/MM3 (ref 1.2–3.4)
LYMPHOCYTES NFR BLD: 34.4 % (ref 20–51)
MCH RBC QN AUTO: 30 PG (ref 27–31)
MCHC RBC AUTO-ENTMCNC: 32 G/DL (ref 33–37)
MCV RBC AUTO: 94 FL (ref 80–100)
MONOCYTES # BLD: 0.8 K/MM3 (ref 0.1–0.6)
MONOCYTES NFR BLD AUTO: 9.9 % (ref 1.7–9.3)
NEUTROPHILS # BLD: 4.2 K/MM3 (ref 1.4–6.5)
PLATELET # BLD AUTO: 212 K/MM3 (ref 130–400)
PMV BLD AUTO: 10.7 FL (ref 7.4–10.4)
POTASSIUM SERPL-SCNC: 4.2 MMOL/L (ref 3.5–4.5)
RBC # BLD AUTO: 3.6 M/MM3 (ref 4.2–5.6)
SODIUM SERPL-SCNC: 138 MMOL/L (ref 136–145)

## 2022-02-25 NOTE — NUR
PT MET CRITERIA FOR DISCHARGE, VSS. PT DENIES CHEST PAIN. IV REMOVED WITHOUT
COMPLICATIONS, CATHETER INTACT. DISCHARGE INSTRUCTIONS REVIEWED, PT VERBALIZED
UNDERSTANDING. PT AWARE OF F/U APPTS. PT DC TO HOME VIA WHEELCHAIR ACCOMPANIED
BY CNA.

## 2022-09-10 ENCOUNTER — HOSPITAL ENCOUNTER (EMERGENCY)
Dept: HOSPITAL 19 - COL.ER | Age: 87
Discharge: HOME | End: 2022-09-10
Attending: STUDENT IN AN ORGANIZED HEALTH CARE EDUCATION/TRAINING PROGRAM
Payer: MEDICARE

## 2022-09-10 VITALS — BODY MASS INDEX: 25.06 KG/M2 | HEIGHT: 67.99 IN | WEIGHT: 165.35 LBS

## 2022-09-10 VITALS — HEART RATE: 66 BPM

## 2022-09-10 VITALS — DIASTOLIC BLOOD PRESSURE: 53 MMHG | SYSTOLIC BLOOD PRESSURE: 93 MMHG

## 2022-09-10 DIAGNOSIS — I48.91: ICD-10-CM

## 2022-09-10 DIAGNOSIS — S51.811A: ICD-10-CM

## 2022-09-10 DIAGNOSIS — Z79.899: ICD-10-CM

## 2022-09-10 DIAGNOSIS — I25.10: ICD-10-CM

## 2022-09-10 DIAGNOSIS — S31.823A: Primary | ICD-10-CM

## 2022-09-10 DIAGNOSIS — Z79.01: ICD-10-CM

## 2022-09-10 DIAGNOSIS — Z79.82: ICD-10-CM
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
